# Patient Record
Sex: MALE | Race: BLACK OR AFRICAN AMERICAN | NOT HISPANIC OR LATINO | Employment: OTHER | URBAN - METROPOLITAN AREA
[De-identification: names, ages, dates, MRNs, and addresses within clinical notes are randomized per-mention and may not be internally consistent; named-entity substitution may affect disease eponyms.]

---

## 2018-08-30 ENCOUNTER — OFFICE VISIT (OUTPATIENT)
Dept: PODIATRY | Facility: CLINIC | Age: 73
End: 2018-08-30
Payer: MEDICARE

## 2018-08-30 DIAGNOSIS — L60.8 DISCOLORATION OF NAIL: ICD-10-CM

## 2018-08-30 DIAGNOSIS — M79.674 PAIN OF TOE OF RIGHT FOOT: ICD-10-CM

## 2018-08-30 DIAGNOSIS — S99.921A INJURY OF TOENAIL OF RIGHT FOOT, INITIAL ENCOUNTER: Primary | ICD-10-CM

## 2018-08-30 PROCEDURE — 99201 *HC E&M-NEW PATIENT-LVL I: CPT | Mod: PBBFAC | Performed by: PODIATRIST

## 2018-08-30 PROCEDURE — 99999 PR PBB SHADOW E&M-NEW PATIENT-LVL I: CPT | Mod: PBBFAC,,, | Performed by: PODIATRIST

## 2018-08-30 PROCEDURE — 99202 OFFICE O/P NEW SF 15 MIN: CPT | Mod: S$PBB,,, | Performed by: PODIATRIST

## 2018-08-30 RX ORDER — TAMSULOSIN HYDROCHLORIDE 0.4 MG/1
CAPSULE ORAL
COMMUNITY
Start: 2018-08-28 | End: 2021-06-16 | Stop reason: SDUPTHER

## 2018-08-30 RX ORDER — PRAVASTATIN SODIUM 20 MG/1
10 TABLET ORAL
COMMUNITY
Start: 2018-08-28 | End: 2021-05-06

## 2018-09-02 NOTE — PROGRESS NOTES
Subjective:      Patient ID: Madi Renee is a 73 y.o. male.    Chief Complaint: Nail Problem (Hit Lt big toe and nail is black)  Patient presents with complaint pain and discoloration of right big toenail. He first noticed the nail was black about 2 weeks ago, can not recall trauma, had some discomfort Sunday possibily due to shoes he was wearing. Denies drainage, no pain today.      ROS     Constitutional    Pleasant, well-nourished, no distress, well oriented. Patient relates he is in good health, takes medication for HTN and cholesterol, both well controlled    Eyes         GLASSES    Cardiovascular          No chest pain, no shortness of breath    Respiratory           No cough, no congestion     Musculoskeletal        No muscle aches, no arthralgias/joint pain, no back pain, no swelling  in the extremities           Objective:      Physical Exam  Vascular         Arterial Pulses Right: posterior tibialis 2/4, dorsalis pedis 2/4, normal CFT   Arterial Pulses Left: posterior tibialis 2/4, dorsalis pedis 2/4, normal CFT   No lower extremity edema bilateral   Pedal skin temperature and color are normal bilateral     Integumentary   Majority of right hallux nail is black, obvious trauma occurred some time ago. Nail is flat, attached at this time, no evidence of subungual hematoma, no pain upon palpation nail plate. No surrounding erythema or edema. There is brusing of lateral second digit nail left foot, stable. Skin is in good condition, no other complications.           Neurological   Gross sensation intact    Musculoskeletal   Muscle Strength/Testing and Tone:  Intact, normal tone bilateral   Joints, Bones, and Muscles: Normal with normal ROM consistent with age           Assessment:       Encounter Diagnoses   Name Primary?    Injury of toenail of right foot, initial encounter Yes    Discoloration of nail - Right Foot     Pain of toe of right foot          Plan:       Madi was seen today for nail  problem.    Diagnoses and all orders for this visit:    Injury of toenail of right foot, initial encounter    Discoloration of nail - Right Foot    Pain of toe of right foot      Advised patient there was definitely trauma to the right great toenail.  We discussed extent of bruising, bleeding under the nail and pointed out injury involved the and this 2nd digit nail as well.  Advised patient areas stable, nail is flat with no tenderness upon palpation.  Advised patient he needs to wear wide, comfortable shoes with no additional pressure on this area.  Explained as long as the area stays flat and is pain free discoloration of the nail should grow out without complication, however injuries involving the nail can progress quickly if a sore abscess develops underneath and cannot drain.  Explained if pain, redness and swelling occur he should start immediately to soak in warm water and Epson salt, if this is not effective after a day or 2 he needs to contact the office for follow-up.  We discussed signs of infection to monitor for.  Instructed patient to keep the area clean, dry, no antibiotic ointment.  Patient related he was in understanding and agreement with treatment plan.  Counseled the patient on his conditions, their implications and medical management.  Instructed patient to contact the office with any changes, questions, concerns, worsening of symptoms. Patient verbalized understanding.   Total face to face time, exam, assessment, treatment, discussion, documentation 20 minutes, more than half this time spent on consultation and coordination of care.   Follow as needed.      This note was created using M*Modal voice recognition software that occasionally misinterpreted phrases or words.

## 2018-11-12 ENCOUNTER — LAB VISIT (OUTPATIENT)
Dept: LAB | Facility: HOSPITAL | Age: 73
End: 2018-11-12
Attending: UROLOGY
Payer: MEDICARE

## 2018-11-12 DIAGNOSIS — R97.20 ELEVATED PROSTATE SPECIFIC ANTIGEN (PSA): Primary | ICD-10-CM

## 2018-11-12 LAB — COMPLEXED PSA SERPL-MCNC: 1.7 NG/ML

## 2018-11-12 PROCEDURE — 84153 ASSAY OF PSA TOTAL: CPT

## 2018-11-12 PROCEDURE — 36415 COLL VENOUS BLD VENIPUNCTURE: CPT

## 2019-02-16 ENCOUNTER — HOSPITAL ENCOUNTER (EMERGENCY)
Facility: HOSPITAL | Age: 74
Discharge: HOME | End: 2019-02-16
Attending: EMERGENCY MEDICINE
Payer: MEDICARE

## 2019-02-16 VITALS
DIASTOLIC BLOOD PRESSURE: 76 MMHG | SYSTOLIC BLOOD PRESSURE: 145 MMHG | TEMPERATURE: 98 F | HEART RATE: 57 BPM | BODY MASS INDEX: 25.62 KG/M2 | RESPIRATION RATE: 23 BRPM | WEIGHT: 173 LBS | OXYGEN SATURATION: 97 % | HEIGHT: 69 IN

## 2019-02-16 DIAGNOSIS — H81.10 BENIGN PAROXYSMAL POSITIONAL VERTIGO, UNSPECIFIED LATERALITY: Primary | ICD-10-CM

## 2019-02-16 DIAGNOSIS — R42 DIZZINESS: ICD-10-CM

## 2019-02-16 PROCEDURE — 25000003 PHARM REV CODE 250: Performed by: EMERGENCY MEDICINE

## 2019-02-16 PROCEDURE — 93005 ELECTROCARDIOGRAM TRACING: CPT

## 2019-02-16 PROCEDURE — 93010 EKG 12-LEAD: ICD-10-PCS | Mod: ,,, | Performed by: INTERNAL MEDICINE

## 2019-02-16 PROCEDURE — 99283 EMERGENCY DEPT VISIT LOW MDM: CPT

## 2019-02-16 PROCEDURE — 93010 ELECTROCARDIOGRAM REPORT: CPT | Mod: ,,, | Performed by: INTERNAL MEDICINE

## 2019-02-16 RX ORDER — FLUNISOLIDE 0.25 MG/ML
2 SOLUTION NASAL
COMMUNITY
End: 2021-05-06

## 2019-02-16 RX ORDER — MECLIZINE HCL 12.5 MG 12.5 MG/1
12.5 TABLET ORAL 3 TIMES DAILY PRN
Qty: 20 TABLET | Refills: 0 | Status: SHIPPED | OUTPATIENT
Start: 2019-02-16 | End: 2021-05-06

## 2019-02-16 RX ORDER — MECLIZINE HCL 12.5 MG 12.5 MG/1
12.5 TABLET ORAL
Status: COMPLETED | OUTPATIENT
Start: 2019-02-16 | End: 2019-02-16

## 2019-02-16 RX ORDER — ASPIRIN 81 MG/1
81 TABLET ORAL DAILY
COMMUNITY
End: 2021-05-06

## 2019-02-16 RX ORDER — VALSARTAN 80 MG/1
80 TABLET ORAL DAILY
COMMUNITY
End: 2021-05-06 | Stop reason: SDUPTHER

## 2019-02-16 RX ORDER — FINASTERIDE 5 MG/1
5 TABLET, FILM COATED ORAL DAILY
COMMUNITY
End: 2021-06-16 | Stop reason: SDUPTHER

## 2019-02-16 RX ORDER — MINERAL OIL
180 ENEMA (ML) RECTAL DAILY
COMMUNITY
End: 2021-06-19

## 2019-02-16 RX ADMIN — MECLIZINE HCL 12.5 MG: 12.5 TABLET ORAL at 09:02

## 2019-02-16 NOTE — ED PROVIDER NOTES
Encounter Date: 2/16/2019       History     Chief Complaint   Patient presents with    Dizziness     pt repors he awoke this am with dizziness     72yo male with pmh benign positional vertigo diagnosed and followed by Dr. Platt (ENT) presents to ED for evaluation of dizziness upon waking this morning. States he woke up, was dizzy, did the maneuvers he was taught, and had some relief. However, later this morning, he again became dizzy and decided to come in for evaluation. Denies any pain complaints, reports no falls.             Review of patient's allergies indicates:  No Known Allergies  Past Medical History:   Diagnosis Date    High cholesterol     Hypertension     Vertigo      Past Surgical History:   Procedure Laterality Date    pallup removal      SHOULDER SURGERY Right     SPINAL FUSION       History reviewed. No pertinent family history.  Social History     Tobacco Use    Smoking status: Never Smoker    Smokeless tobacco: Never Used   Substance Use Topics    Alcohol use: No     Frequency: Never    Drug use: No     Review of Systems   Constitutional: Negative for chills, diaphoresis, fatigue and fever.   HENT: Negative for congestion, ear pain, rhinorrhea, sinus pressure, sinus pain, sneezing, sore throat, tinnitus and trouble swallowing.    Eyes: Negative for photophobia, redness and visual disturbance.   Respiratory: Negative for cough, choking, chest tightness, shortness of breath, wheezing and stridor.    Cardiovascular: Negative for chest pain, palpitations and leg swelling.   Gastrointestinal: Negative for abdominal pain, constipation, diarrhea, nausea and vomiting.   Endocrine: Negative for cold intolerance, heat intolerance, polydipsia, polyphagia and polyuria.   Genitourinary: Negative for decreased urine volume, difficulty urinating, dysuria, flank pain, frequency and urgency.   Musculoskeletal: Negative for arthralgias, back pain, gait problem, joint swelling, myalgias, neck pain and  neck stiffness.   Skin: Negative for color change, pallor, rash and wound.   Neurological: Positive for dizziness. Negative for tremors, seizures, syncope, facial asymmetry, speech difficulty, weakness, light-headedness, numbness and headaches.   Hematological: Negative for adenopathy. Does not bruise/bleed easily.   All other systems reviewed and are negative.      Physical Exam     Initial Vitals [02/16/19 0931]   BP Pulse Resp Temp SpO2   (!) 148/78 (!) 55 20 98.2 °F (36.8 °C) 95 %      MAP       --         Physical Exam    Nursing note and vitals reviewed.  Constitutional: He appears well-developed and well-nourished. He is not diaphoretic. No distress.   HENT:   Head: Normocephalic and atraumatic.   Right Ear: External ear normal.   Left Ear: External ear normal.   Nose: Nose normal.   Mouth/Throat: Oropharynx is clear and moist. No oropharyngeal exudate.   Eyes: Conjunctivae and EOM are normal. Pupils are equal, round, and reactive to light. No scleral icterus. Right eye exhibits no nystagmus. Left eye exhibits no nystagmus.   Neck: Normal range of motion. Neck supple.   Cardiovascular: Normal rate, regular rhythm, normal heart sounds and intact distal pulses.   No murmur heard.  Pulmonary/Chest: Breath sounds normal. No respiratory distress. He has no wheezes. He has no rhonchi. He exhibits no tenderness.   Abdominal: Soft. Bowel sounds are normal. He exhibits no distension. There is no tenderness. There is no rebound and no guarding.   Musculoskeletal: Normal range of motion. He exhibits no edema or tenderness.   Neurological: He is alert and oriented to person, place, and time. He has normal strength and normal reflexes. He displays normal reflexes. No cranial nerve deficit or sensory deficit. GCS score is 15. GCS eye subscore is 4. GCS verbal subscore is 5. GCS motor subscore is 6.   Skin: Skin is warm. Capillary refill takes less than 2 seconds. No rash noted. No erythema.   Psychiatric: He has a normal  mood and affect. His behavior is normal. Judgment and thought content normal.         ED Course   Procedures  Labs Reviewed - No data to display  EKG Readings: (Independently Interpreted)   Initial Reading: No STEMI. Rhythm: Sinus Bradycardia. Heart Rate: 54. Ectopy: No Ectopy. Conduction: Normal. ST Segments: Normal ST Segments. T Waves: Normal. Axis: Normal. Clinical Impression: Sinus Bradycardia       Imaging Results    None          Medical Decision Making:   Differential Diagnosis:   Benign positional vertigo, orthostatic hypotension  ED Management:  EKG and orthostatics unremarkable. Improved with meclizine. Pt comfortable with discharge and close follow up with Dr. Platt.                       Clinical Impression:   The primary encounter diagnosis was Benign paroxysmal positional vertigo, unspecified laterality. A diagnosis of Dizziness was also pertinent to this visit.      Disposition:   Disposition: Discharged  Condition: Stable                        Nicolle Savage MD  02/16/19 1016

## 2020-10-27 DIAGNOSIS — M25.512 PAIN IN LEFT SHOULDER: ICD-10-CM

## 2020-10-27 DIAGNOSIS — M25.572 PAIN IN LEFT ANKLE: Primary | ICD-10-CM

## 2021-02-23 ENCOUNTER — LAB VISIT (OUTPATIENT)
Dept: LAB | Facility: HOSPITAL | Age: 76
End: 2021-02-23
Attending: UROLOGY
Payer: MEDICARE

## 2021-02-23 DIAGNOSIS — Z12.5 SPECIAL SCREENING FOR MALIGNANT NEOPLASM OF PROSTATE: Primary | ICD-10-CM

## 2021-02-23 LAB — COMPLEXED PSA SERPL-MCNC: 2.2 NG/ML (ref 0–4)

## 2021-02-23 PROCEDURE — 84153 ASSAY OF PSA TOTAL: CPT

## 2021-02-23 PROCEDURE — 36415 COLL VENOUS BLD VENIPUNCTURE: CPT

## 2021-03-08 ENCOUNTER — OFFICE VISIT (OUTPATIENT)
Dept: PODIATRY | Facility: CLINIC | Age: 76
End: 2021-03-08
Payer: MEDICARE

## 2021-03-08 VITALS
DIASTOLIC BLOOD PRESSURE: 76 MMHG | HEIGHT: 69 IN | TEMPERATURE: 98 F | HEART RATE: 63 BPM | BODY MASS INDEX: 23.99 KG/M2 | WEIGHT: 162 LBS | SYSTOLIC BLOOD PRESSURE: 137 MMHG

## 2021-03-08 DIAGNOSIS — M76.829 TIBIALIS POSTERIOR TENDINITIS, UNSPECIFIED LATERALITY: ICD-10-CM

## 2021-03-08 DIAGNOSIS — M76.70 PERONEAL TENDONITIS, UNSPECIFIED LATERALITY: Primary | ICD-10-CM

## 2021-03-08 PROCEDURE — 99213 OFFICE O/P EST LOW 20 MIN: CPT | Mod: PBBFAC | Performed by: PODIATRIST

## 2021-03-08 PROCEDURE — 99214 PR OFFICE/OUTPT VISIT, EST, LEVL IV, 30-39 MIN: ICD-10-PCS | Mod: S$PBB,,, | Performed by: PODIATRIST

## 2021-03-08 PROCEDURE — 99214 OFFICE O/P EST MOD 30 MIN: CPT | Mod: S$PBB,,, | Performed by: PODIATRIST

## 2021-03-08 PROCEDURE — 99999 PR PBB SHADOW E&M-EST. PATIENT-LVL III: CPT | Mod: PBBFAC,,, | Performed by: PODIATRIST

## 2021-03-08 PROCEDURE — 99999 PR PBB SHADOW E&M-EST. PATIENT-LVL III: ICD-10-PCS | Mod: PBBFAC,,, | Performed by: PODIATRIST

## 2021-03-08 RX ORDER — PRAVASTATIN SODIUM 40 MG/1
TABLET ORAL
COMMUNITY
Start: 2021-02-09 | End: 2021-05-06 | Stop reason: SDUPTHER

## 2021-03-08 RX ORDER — FLUOROURACIL 50 MG/G
CREAM TOPICAL
COMMUNITY
Start: 2020-12-30 | End: 2021-05-06

## 2021-03-08 RX ORDER — FLUTICASONE PROPIONATE 50 MCG
SPRAY, SUSPENSION (ML) NASAL
COMMUNITY
Start: 2021-02-09 | End: 2021-05-19 | Stop reason: SDUPTHER

## 2021-05-03 ENCOUNTER — TELEPHONE (OUTPATIENT)
Dept: PODIATRY | Facility: CLINIC | Age: 76
End: 2021-05-03

## 2021-05-06 ENCOUNTER — PATIENT OUTREACH (OUTPATIENT)
Dept: ADMINISTRATIVE | Facility: HOSPITAL | Age: 76
End: 2021-05-06

## 2021-05-06 ENCOUNTER — OFFICE VISIT (OUTPATIENT)
Dept: FAMILY MEDICINE | Facility: CLINIC | Age: 76
End: 2021-05-06
Payer: MEDICARE

## 2021-05-06 ENCOUNTER — LAB VISIT (OUTPATIENT)
Dept: LAB | Facility: HOSPITAL | Age: 76
End: 2021-05-06
Attending: FAMILY MEDICINE
Payer: MEDICARE

## 2021-05-06 VITALS
WEIGHT: 164.81 LBS | OXYGEN SATURATION: 97 % | SYSTOLIC BLOOD PRESSURE: 124 MMHG | HEART RATE: 84 BPM | DIASTOLIC BLOOD PRESSURE: 64 MMHG | HEIGHT: 69 IN | RESPIRATION RATE: 12 BRPM | BODY MASS INDEX: 24.41 KG/M2 | TEMPERATURE: 98 F

## 2021-05-06 DIAGNOSIS — Z11.4 SCREENING FOR HIV (HUMAN IMMUNODEFICIENCY VIRUS): ICD-10-CM

## 2021-05-06 DIAGNOSIS — Z00.00 ANNUAL PHYSICAL EXAM: ICD-10-CM

## 2021-05-06 DIAGNOSIS — Z13.6 ENCOUNTER FOR SCREENING FOR CARDIOVASCULAR DISORDERS: ICD-10-CM

## 2021-05-06 DIAGNOSIS — R79.9 ABNORMAL FINDING OF BLOOD CHEMISTRY, UNSPECIFIED: ICD-10-CM

## 2021-05-06 DIAGNOSIS — Z11.59 ENCOUNTER FOR HEPATITIS C SCREENING TEST FOR LOW RISK PATIENT: ICD-10-CM

## 2021-05-06 DIAGNOSIS — I10 ESSENTIAL HYPERTENSION: ICD-10-CM

## 2021-05-06 DIAGNOSIS — N40.0 BENIGN PROSTATIC HYPERPLASIA WITHOUT LOWER URINARY TRACT SYMPTOMS: ICD-10-CM

## 2021-05-06 DIAGNOSIS — E78.5 HYPERLIPIDEMIA, UNSPECIFIED HYPERLIPIDEMIA TYPE: ICD-10-CM

## 2021-05-06 DIAGNOSIS — Z76.89 ENCOUNTER TO ESTABLISH CARE: Primary | ICD-10-CM

## 2021-05-06 LAB
ALBUMIN SERPL BCP-MCNC: 4.2 G/DL (ref 3.5–5.2)
ALP SERPL-CCNC: 95 U/L (ref 55–135)
ALT SERPL W/O P-5'-P-CCNC: 18 U/L (ref 10–44)
ANION GAP SERPL CALC-SCNC: 8 MMOL/L (ref 8–16)
AST SERPL-CCNC: 21 U/L (ref 10–40)
BASOPHILS # BLD AUTO: 0.03 K/UL (ref 0–0.2)
BASOPHILS NFR BLD: 0.5 % (ref 0–1.9)
BILIRUB SERPL-MCNC: 1.4 MG/DL (ref 0.1–1)
BUN SERPL-MCNC: 11 MG/DL (ref 8–23)
CALCIUM SERPL-MCNC: 8.9 MG/DL (ref 8.7–10.5)
CHLORIDE SERPL-SCNC: 106 MMOL/L (ref 95–110)
CHOLEST SERPL-MCNC: 128 MG/DL (ref 120–199)
CHOLEST/HDLC SERPL: 3.6 {RATIO} (ref 2–5)
CO2 SERPL-SCNC: 26 MMOL/L (ref 23–29)
CREAT SERPL-MCNC: 0.8 MG/DL (ref 0.5–1.4)
DIFFERENTIAL METHOD: ABNORMAL
EOSINOPHIL # BLD AUTO: 0.5 K/UL (ref 0–0.5)
EOSINOPHIL NFR BLD: 8.8 % (ref 0–8)
ERYTHROCYTE [DISTWIDTH] IN BLOOD BY AUTOMATED COUNT: 12.4 % (ref 11.5–14.5)
EST. GFR  (AFRICAN AMERICAN): >60 ML/MIN/1.73 M^2
EST. GFR  (NON AFRICAN AMERICAN): >60 ML/MIN/1.73 M^2
GLUCOSE SERPL-MCNC: 92 MG/DL (ref 70–110)
HCT VFR BLD AUTO: 44.5 % (ref 40–54)
HDLC SERPL-MCNC: 36 MG/DL (ref 40–75)
HDLC SERPL: 28.1 % (ref 20–50)
HGB BLD-MCNC: 15.2 G/DL (ref 14–18)
IMM GRANULOCYTES # BLD AUTO: 0.01 K/UL (ref 0–0.04)
IMM GRANULOCYTES NFR BLD AUTO: 0.2 % (ref 0–0.5)
LDLC SERPL CALC-MCNC: 81.4 MG/DL (ref 63–159)
LYMPHOCYTES # BLD AUTO: 1.1 K/UL (ref 1–4.8)
LYMPHOCYTES NFR BLD: 17.5 % (ref 18–48)
MCH RBC QN AUTO: 30.8 PG (ref 27–31)
MCHC RBC AUTO-ENTMCNC: 34.2 G/DL (ref 32–36)
MCV RBC AUTO: 90 FL (ref 82–98)
MONOCYTES # BLD AUTO: 0.5 K/UL (ref 0.3–1)
MONOCYTES NFR BLD: 7.9 % (ref 4–15)
NEUTROPHILS # BLD AUTO: 3.9 K/UL (ref 1.8–7.7)
NEUTROPHILS NFR BLD: 65.1 % (ref 38–73)
NONHDLC SERPL-MCNC: 92 MG/DL
NRBC BLD-RTO: 0 /100 WBC
PLATELET # BLD AUTO: 202 K/UL (ref 150–450)
PMV BLD AUTO: 9.2 FL (ref 9.2–12.9)
POTASSIUM SERPL-SCNC: 4.1 MMOL/L (ref 3.5–5.1)
PROT SERPL-MCNC: 7.8 G/DL (ref 6–8.4)
RBC # BLD AUTO: 4.93 M/UL (ref 4.6–6.2)
SODIUM SERPL-SCNC: 140 MMOL/L (ref 136–145)
TRIGL SERPL-MCNC: 53 MG/DL (ref 30–150)
WBC # BLD AUTO: 6.04 K/UL (ref 3.9–12.7)

## 2021-05-06 PROCEDURE — 99204 PR OFFICE/OUTPT VISIT, NEW, LEVL IV, 45-59 MIN: ICD-10-PCS | Mod: S$GLB,,, | Performed by: FAMILY MEDICINE

## 2021-05-06 PROCEDURE — 99204 OFFICE O/P NEW MOD 45 MIN: CPT | Mod: S$GLB,,, | Performed by: FAMILY MEDICINE

## 2021-05-06 PROCEDURE — 80053 COMPREHEN METABOLIC PANEL: CPT | Performed by: FAMILY MEDICINE

## 2021-05-06 PROCEDURE — 36415 COLL VENOUS BLD VENIPUNCTURE: CPT | Performed by: FAMILY MEDICINE

## 2021-05-06 PROCEDURE — 86703 HIV-1/HIV-2 1 RESULT ANTBDY: CPT | Performed by: FAMILY MEDICINE

## 2021-05-06 PROCEDURE — 80061 LIPID PANEL: CPT | Performed by: FAMILY MEDICINE

## 2021-05-06 PROCEDURE — 85025 COMPLETE CBC W/AUTO DIFF WBC: CPT | Performed by: FAMILY MEDICINE

## 2021-05-06 PROCEDURE — 86803 HEPATITIS C AB TEST: CPT | Performed by: FAMILY MEDICINE

## 2021-05-06 RX ORDER — VALSARTAN 80 MG/1
80 TABLET ORAL DAILY
Qty: 90 TABLET | Refills: 3 | Status: SHIPPED | OUTPATIENT
Start: 2021-05-06 | End: 2022-05-11 | Stop reason: SDUPTHER

## 2021-05-06 RX ORDER — PRAVASTATIN SODIUM 40 MG/1
40 TABLET ORAL DAILY
Qty: 90 TABLET | Refills: 3 | Status: SHIPPED | OUTPATIENT
Start: 2021-05-06 | End: 2022-05-11 | Stop reason: SDUPTHER

## 2021-05-07 LAB
HCV AB SERPL QL IA: NEGATIVE
HIV 1+2 AB+HIV1 P24 AG SERPL QL IA: NEGATIVE

## 2021-05-19 RX ORDER — FLUTICASONE PROPIONATE 50 MCG
2 SPRAY, SUSPENSION (ML) NASAL DAILY
Qty: 9.9 ML | Refills: 11 | Status: SHIPPED | OUTPATIENT
Start: 2021-05-19 | End: 2021-10-09 | Stop reason: SDUPTHER

## 2021-06-02 ENCOUNTER — OFFICE VISIT (OUTPATIENT)
Dept: PODIATRY | Facility: CLINIC | Age: 76
End: 2021-06-02
Payer: MEDICARE

## 2021-06-02 VITALS
HEIGHT: 69 IN | BODY MASS INDEX: 23.99 KG/M2 | HEART RATE: 56 BPM | DIASTOLIC BLOOD PRESSURE: 68 MMHG | WEIGHT: 162 LBS | SYSTOLIC BLOOD PRESSURE: 128 MMHG | TEMPERATURE: 98 F

## 2021-06-02 DIAGNOSIS — M76.70 PERONEAL TENDONITIS, UNSPECIFIED LATERALITY: Primary | ICD-10-CM

## 2021-06-02 PROCEDURE — 99999 PR PBB SHADOW E&M-EST. PATIENT-LVL III: CPT | Mod: PBBFAC,,, | Performed by: PODIATRIST

## 2021-06-02 PROCEDURE — 99213 OFFICE O/P EST LOW 20 MIN: CPT | Mod: S$PBB,,, | Performed by: PODIATRIST

## 2021-06-02 PROCEDURE — 99213 OFFICE O/P EST LOW 20 MIN: CPT | Mod: PBBFAC | Performed by: PODIATRIST

## 2021-06-02 PROCEDURE — 99999 PR PBB SHADOW E&M-EST. PATIENT-LVL III: ICD-10-PCS | Mod: PBBFAC,,, | Performed by: PODIATRIST

## 2021-06-02 PROCEDURE — 99213 PR OFFICE/OUTPT VISIT, EST, LEVL III, 20-29 MIN: ICD-10-PCS | Mod: S$PBB,,, | Performed by: PODIATRIST

## 2021-06-05 PROBLEM — M76.70 PERONEAL TENDONITIS, UNSPECIFIED LATERALITY: Status: ACTIVE | Noted: 2021-06-05

## 2021-06-16 ENCOUNTER — OFFICE VISIT (OUTPATIENT)
Dept: PODIATRY | Facility: CLINIC | Age: 76
End: 2021-06-16
Payer: MEDICARE

## 2021-06-16 VITALS
BODY MASS INDEX: 23.99 KG/M2 | HEART RATE: 58 BPM | DIASTOLIC BLOOD PRESSURE: 58 MMHG | WEIGHT: 162 LBS | RESPIRATION RATE: 18 BRPM | SYSTOLIC BLOOD PRESSURE: 116 MMHG | HEIGHT: 69 IN

## 2021-06-16 DIAGNOSIS — M76.70 PERONEAL TENDONITIS, UNSPECIFIED LATERALITY: Primary | ICD-10-CM

## 2021-06-16 PROCEDURE — 99999 PR PBB SHADOW E&M-EST. PATIENT-LVL III: CPT | Mod: PBBFAC,,, | Performed by: PODIATRIST

## 2021-06-16 PROCEDURE — 99213 PR OFFICE/OUTPT VISIT, EST, LEVL III, 20-29 MIN: ICD-10-PCS | Mod: S$PBB,,, | Performed by: PODIATRIST

## 2021-06-16 PROCEDURE — 99213 OFFICE O/P EST LOW 20 MIN: CPT | Mod: S$PBB,,, | Performed by: PODIATRIST

## 2021-06-16 PROCEDURE — 99213 OFFICE O/P EST LOW 20 MIN: CPT | Mod: PBBFAC | Performed by: PODIATRIST

## 2021-06-16 PROCEDURE — 99999 PR PBB SHADOW E&M-EST. PATIENT-LVL III: ICD-10-PCS | Mod: PBBFAC,,, | Performed by: PODIATRIST

## 2021-06-16 RX ORDER — FEXOFENADINE HCL 60 MG
TABLET ORAL
COMMUNITY
End: 2023-10-20

## 2021-06-16 RX ORDER — PRAVASTATIN SODIUM 40 MG/1
40 TABLET ORAL
COMMUNITY
Start: 2021-03-09 | End: 2021-06-19 | Stop reason: SDUPTHER

## 2021-06-16 RX ORDER — TAMSULOSIN HYDROCHLORIDE 0.4 MG/1
1 CAPSULE ORAL
COMMUNITY
End: 2023-11-27 | Stop reason: SDUPTHER

## 2021-06-16 RX ORDER — FLUOROURACIL 50 MG/G
CREAM TOPICAL
COMMUNITY
End: 2021-11-11

## 2021-06-16 RX ORDER — FINASTERIDE 5 MG/1
TABLET, FILM COATED ORAL
COMMUNITY
End: 2023-11-27 | Stop reason: SDUPTHER

## 2021-08-20 ENCOUNTER — LAB VISIT (OUTPATIENT)
Dept: LAB | Facility: HOSPITAL | Age: 76
End: 2021-08-20
Attending: UROLOGY
Payer: MEDICARE

## 2021-08-20 DIAGNOSIS — Z12.5 SPECIAL SCREENING FOR MALIGNANT NEOPLASM OF PROSTATE: Primary | ICD-10-CM

## 2021-08-20 LAB — COMPLEXED PSA SERPL-MCNC: 2.6 NG/ML (ref 0–4)

## 2021-08-20 PROCEDURE — 36415 COLL VENOUS BLD VENIPUNCTURE: CPT | Performed by: UROLOGY

## 2021-08-20 PROCEDURE — 84153 ASSAY OF PSA TOTAL: CPT | Performed by: UROLOGY

## 2021-10-09 ENCOUNTER — IMMUNIZATION (OUTPATIENT)
Dept: FAMILY MEDICINE | Facility: CLINIC | Age: 76
End: 2021-10-09
Payer: MEDICARE

## 2021-10-09 DIAGNOSIS — L29.9 SCALP ITCH: Primary | ICD-10-CM

## 2021-10-09 PROCEDURE — 90662 IIV NO PRSV INCREASED AG IM: CPT | Mod: PBBFAC

## 2021-10-09 PROCEDURE — G0008 ADMIN INFLUENZA VIRUS VAC: HCPCS | Mod: PBBFAC

## 2021-10-11 RX ORDER — FLUTICASONE PROPIONATE 50 MCG
2 SPRAY, SUSPENSION (ML) NASAL DAILY
Qty: 9.9 ML | Refills: 11 | Status: SHIPPED | OUTPATIENT
Start: 2021-10-11 | End: 2022-07-01 | Stop reason: SDUPTHER

## 2021-10-29 ENCOUNTER — OFFICE VISIT (OUTPATIENT)
Dept: FAMILY MEDICINE | Facility: CLINIC | Age: 76
End: 2021-10-29
Payer: MEDICARE

## 2021-10-29 VITALS
HEART RATE: 57 BPM | TEMPERATURE: 98 F | BODY MASS INDEX: 24.73 KG/M2 | HEIGHT: 69 IN | WEIGHT: 167 LBS | OXYGEN SATURATION: 97 % | DIASTOLIC BLOOD PRESSURE: 62 MMHG | SYSTOLIC BLOOD PRESSURE: 130 MMHG | RESPIRATION RATE: 18 BRPM

## 2021-10-29 DIAGNOSIS — K13.70 ORAL MUCOSAL LESION: Primary | ICD-10-CM

## 2021-10-29 DIAGNOSIS — I10 ESSENTIAL HYPERTENSION: ICD-10-CM

## 2021-10-29 PROCEDURE — 99213 OFFICE O/P EST LOW 20 MIN: CPT | Mod: S$PBB,,, | Performed by: NURSE PRACTITIONER

## 2021-10-29 PROCEDURE — 99214 OFFICE O/P EST MOD 30 MIN: CPT | Mod: PBBFAC | Performed by: NURSE PRACTITIONER

## 2021-10-29 PROCEDURE — 99999 PR PBB SHADOW E&M-EST. PATIENT-LVL IV: CPT | Mod: PBBFAC,,, | Performed by: NURSE PRACTITIONER

## 2021-10-29 PROCEDURE — 99999 PR PBB SHADOW E&M-EST. PATIENT-LVL IV: ICD-10-PCS | Mod: PBBFAC,,, | Performed by: NURSE PRACTITIONER

## 2021-10-29 PROCEDURE — 99213 PR OFFICE/OUTPT VISIT, EST, LEVL III, 20-29 MIN: ICD-10-PCS | Mod: S$PBB,,, | Performed by: NURSE PRACTITIONER

## 2021-11-11 ENCOUNTER — OFFICE VISIT (OUTPATIENT)
Dept: FAMILY MEDICINE | Facility: CLINIC | Age: 76
End: 2021-11-11
Payer: MEDICARE

## 2021-11-11 VITALS
RESPIRATION RATE: 12 BRPM | SYSTOLIC BLOOD PRESSURE: 126 MMHG | BODY MASS INDEX: 25.03 KG/M2 | TEMPERATURE: 98 F | WEIGHT: 169 LBS | OXYGEN SATURATION: 97 % | HEIGHT: 69 IN | HEART RATE: 53 BPM | DIASTOLIC BLOOD PRESSURE: 72 MMHG

## 2021-11-11 DIAGNOSIS — E78.5 HYPERLIPIDEMIA, UNSPECIFIED HYPERLIPIDEMIA TYPE: ICD-10-CM

## 2021-11-11 DIAGNOSIS — N40.0 BENIGN PROSTATIC HYPERPLASIA WITHOUT LOWER URINARY TRACT SYMPTOMS: ICD-10-CM

## 2021-11-11 DIAGNOSIS — I10 ESSENTIAL HYPERTENSION: Primary | ICD-10-CM

## 2021-11-11 PROCEDURE — 99999 PR PBB SHADOW E&M-EST. PATIENT-LVL IV: ICD-10-PCS | Mod: PBBFAC,,, | Performed by: FAMILY MEDICINE

## 2021-11-11 PROCEDURE — 99213 OFFICE O/P EST LOW 20 MIN: CPT | Mod: S$PBB,,, | Performed by: FAMILY MEDICINE

## 2021-11-11 PROCEDURE — 99213 PR OFFICE/OUTPT VISIT, EST, LEVL III, 20-29 MIN: ICD-10-PCS | Mod: S$PBB,,, | Performed by: FAMILY MEDICINE

## 2021-11-11 PROCEDURE — 99999 PR PBB SHADOW E&M-EST. PATIENT-LVL IV: CPT | Mod: PBBFAC,,, | Performed by: FAMILY MEDICINE

## 2021-11-11 PROCEDURE — 99214 OFFICE O/P EST MOD 30 MIN: CPT | Mod: PBBFAC | Performed by: FAMILY MEDICINE

## 2022-05-07 ENCOUNTER — PATIENT MESSAGE (OUTPATIENT)
Dept: FAMILY MEDICINE | Facility: CLINIC | Age: 77
End: 2022-05-07
Payer: MEDICARE

## 2022-05-07 DIAGNOSIS — I10 ESSENTIAL HYPERTENSION: ICD-10-CM

## 2022-05-07 DIAGNOSIS — E78.5 HYPERLIPIDEMIA, UNSPECIFIED HYPERLIPIDEMIA TYPE: ICD-10-CM

## 2022-05-11 DIAGNOSIS — I10 ESSENTIAL HYPERTENSION: ICD-10-CM

## 2022-05-11 RX ORDER — PRAVASTATIN SODIUM 40 MG/1
40 TABLET ORAL DAILY
Qty: 90 TABLET | Refills: 3 | Status: SHIPPED | OUTPATIENT
Start: 2022-05-11 | End: 2023-06-26 | Stop reason: SDUPTHER

## 2022-05-11 RX ORDER — VALSARTAN 80 MG/1
80 TABLET ORAL DAILY
Qty: 90 TABLET | Refills: 3 | Status: SHIPPED | OUTPATIENT
Start: 2022-05-11 | End: 2023-07-05 | Stop reason: SDUPTHER

## 2022-05-16 ENCOUNTER — PATIENT MESSAGE (OUTPATIENT)
Dept: FAMILY MEDICINE | Facility: CLINIC | Age: 77
End: 2022-05-16
Payer: MEDICARE

## 2022-05-19 ENCOUNTER — LAB VISIT (OUTPATIENT)
Dept: LAB | Facility: HOSPITAL | Age: 77
End: 2022-05-19
Attending: FAMILY MEDICINE
Payer: MEDICARE

## 2022-05-19 ENCOUNTER — OFFICE VISIT (OUTPATIENT)
Dept: FAMILY MEDICINE | Facility: CLINIC | Age: 77
End: 2022-05-19
Payer: MEDICARE

## 2022-05-19 VITALS
WEIGHT: 172 LBS | HEART RATE: 55 BPM | OXYGEN SATURATION: 99 % | TEMPERATURE: 98 F | BODY MASS INDEX: 25.48 KG/M2 | SYSTOLIC BLOOD PRESSURE: 134 MMHG | RESPIRATION RATE: 14 BRPM | DIASTOLIC BLOOD PRESSURE: 68 MMHG | HEIGHT: 69 IN

## 2022-05-19 DIAGNOSIS — E78.5 HYPERLIPIDEMIA, UNSPECIFIED HYPERLIPIDEMIA TYPE: ICD-10-CM

## 2022-05-19 DIAGNOSIS — J44.9 MILD CHRONIC OBSTRUCTIVE PULMONARY DISEASE: ICD-10-CM

## 2022-05-19 DIAGNOSIS — I10 ESSENTIAL HYPERTENSION: ICD-10-CM

## 2022-05-19 DIAGNOSIS — Z00.00 ANNUAL PHYSICAL EXAM: Primary | ICD-10-CM

## 2022-05-19 LAB
ALBUMIN SERPL BCP-MCNC: 3.9 G/DL (ref 3.5–5.2)
ALP SERPL-CCNC: 79 U/L (ref 55–135)
ALT SERPL W/O P-5'-P-CCNC: 24 U/L (ref 10–44)
ANION GAP SERPL CALC-SCNC: 8 MMOL/L (ref 8–16)
AST SERPL-CCNC: 24 U/L (ref 10–40)
BASOPHILS # BLD AUTO: 0.03 K/UL (ref 0–0.2)
BASOPHILS NFR BLD: 0.6 % (ref 0–1.9)
BILIRUB SERPL-MCNC: 1.4 MG/DL (ref 0.1–1)
BUN SERPL-MCNC: 11 MG/DL (ref 8–23)
CALCIUM SERPL-MCNC: 9.1 MG/DL (ref 8.7–10.5)
CHLORIDE SERPL-SCNC: 107 MMOL/L (ref 95–110)
CHOLEST SERPL-MCNC: 154 MG/DL (ref 120–199)
CHOLEST/HDLC SERPL: 3.9 {RATIO} (ref 2–5)
CO2 SERPL-SCNC: 25 MMOL/L (ref 23–29)
CREAT SERPL-MCNC: 0.9 MG/DL (ref 0.5–1.4)
DIFFERENTIAL METHOD: ABNORMAL
EOSINOPHIL # BLD AUTO: 0.4 K/UL (ref 0–0.5)
EOSINOPHIL NFR BLD: 7.1 % (ref 0–8)
ERYTHROCYTE [DISTWIDTH] IN BLOOD BY AUTOMATED COUNT: 12.4 % (ref 11.5–14.5)
EST. GFR  (AFRICAN AMERICAN): >60 ML/MIN/1.73 M^2
EST. GFR  (NON AFRICAN AMERICAN): >60 ML/MIN/1.73 M^2
ESTIMATED AVG GLUCOSE: 108 MG/DL (ref 68–131)
GLUCOSE SERPL-MCNC: 98 MG/DL (ref 70–110)
HBA1C MFR BLD: 5.4 % (ref 4–5.6)
HCT VFR BLD AUTO: 45.9 % (ref 40–54)
HDLC SERPL-MCNC: 40 MG/DL (ref 40–75)
HDLC SERPL: 26 % (ref 20–50)
HGB BLD-MCNC: 15.7 G/DL (ref 14–18)
IMM GRANULOCYTES # BLD AUTO: 0.01 K/UL (ref 0–0.04)
IMM GRANULOCYTES NFR BLD AUTO: 0.2 % (ref 0–0.5)
LDLC SERPL CALC-MCNC: 100.6 MG/DL (ref 63–159)
LYMPHOCYTES # BLD AUTO: 1.1 K/UL (ref 1–4.8)
LYMPHOCYTES NFR BLD: 21.2 % (ref 18–48)
MCH RBC QN AUTO: 31.6 PG (ref 27–31)
MCHC RBC AUTO-ENTMCNC: 34.2 G/DL (ref 32–36)
MCV RBC AUTO: 92 FL (ref 82–98)
MONOCYTES # BLD AUTO: 0.5 K/UL (ref 0.3–1)
MONOCYTES NFR BLD: 9.6 % (ref 4–15)
NEUTROPHILS # BLD AUTO: 3.3 K/UL (ref 1.8–7.7)
NEUTROPHILS NFR BLD: 61.3 % (ref 38–73)
NONHDLC SERPL-MCNC: 114 MG/DL
NRBC BLD-RTO: 0 /100 WBC
PLATELET # BLD AUTO: 159 K/UL (ref 150–450)
PMV BLD AUTO: 9.3 FL (ref 9.2–12.9)
POTASSIUM SERPL-SCNC: 4.2 MMOL/L (ref 3.5–5.1)
PROT SERPL-MCNC: 7.4 G/DL (ref 6–8.4)
RBC # BLD AUTO: 4.97 M/UL (ref 4.6–6.2)
SODIUM SERPL-SCNC: 140 MMOL/L (ref 136–145)
TRIGL SERPL-MCNC: 67 MG/DL (ref 30–150)
WBC # BLD AUTO: 5.32 K/UL (ref 3.9–12.7)

## 2022-05-19 PROCEDURE — 1160F PR REVIEW ALL MEDS BY PRESCRIBER/CLIN PHARMACIST DOCUMENTED: ICD-10-PCS | Mod: CPTII,S$GLB,, | Performed by: FAMILY MEDICINE

## 2022-05-19 PROCEDURE — 3078F PR MOST RECENT DIASTOLIC BLOOD PRESSURE < 80 MM HG: ICD-10-PCS | Mod: CPTII,S$GLB,, | Performed by: FAMILY MEDICINE

## 2022-05-19 PROCEDURE — 1159F PR MEDICATION LIST DOCUMENTED IN MEDICAL RECORD: ICD-10-PCS | Mod: CPTII,S$GLB,, | Performed by: FAMILY MEDICINE

## 2022-05-19 PROCEDURE — 80061 LIPID PANEL: CPT | Performed by: FAMILY MEDICINE

## 2022-05-19 PROCEDURE — 99397 PER PM REEVAL EST PAT 65+ YR: CPT | Mod: S$GLB,,, | Performed by: FAMILY MEDICINE

## 2022-05-19 PROCEDURE — 1101F PT FALLS ASSESS-DOCD LE1/YR: CPT | Mod: CPTII,S$GLB,, | Performed by: FAMILY MEDICINE

## 2022-05-19 PROCEDURE — 1126F PR PAIN SEVERITY QUANTIFIED, NO PAIN PRESENT: ICD-10-PCS | Mod: CPTII,S$GLB,, | Performed by: FAMILY MEDICINE

## 2022-05-19 PROCEDURE — 80053 COMPREHEN METABOLIC PANEL: CPT | Performed by: FAMILY MEDICINE

## 2022-05-19 PROCEDURE — 3288F FALL RISK ASSESSMENT DOCD: CPT | Mod: CPTII,S$GLB,, | Performed by: FAMILY MEDICINE

## 2022-05-19 PROCEDURE — 83036 HEMOGLOBIN GLYCOSYLATED A1C: CPT | Performed by: FAMILY MEDICINE

## 2022-05-19 PROCEDURE — 99999 PR PBB SHADOW E&M-EST. PATIENT-LVL III: ICD-10-PCS | Mod: PBBFAC,,, | Performed by: FAMILY MEDICINE

## 2022-05-19 PROCEDURE — 3078F DIAST BP <80 MM HG: CPT | Mod: CPTII,S$GLB,, | Performed by: FAMILY MEDICINE

## 2022-05-19 PROCEDURE — 3075F SYST BP GE 130 - 139MM HG: CPT | Mod: CPTII,S$GLB,, | Performed by: FAMILY MEDICINE

## 2022-05-19 PROCEDURE — 1101F PR PT FALLS ASSESS DOC 0-1 FALLS W/OUT INJ PAST YR: ICD-10-PCS | Mod: CPTII,S$GLB,, | Performed by: FAMILY MEDICINE

## 2022-05-19 PROCEDURE — 36415 COLL VENOUS BLD VENIPUNCTURE: CPT | Performed by: FAMILY MEDICINE

## 2022-05-19 PROCEDURE — 1160F RVW MEDS BY RX/DR IN RCRD: CPT | Mod: CPTII,S$GLB,, | Performed by: FAMILY MEDICINE

## 2022-05-19 PROCEDURE — 1159F MED LIST DOCD IN RCRD: CPT | Mod: CPTII,S$GLB,, | Performed by: FAMILY MEDICINE

## 2022-05-19 PROCEDURE — 85025 COMPLETE CBC W/AUTO DIFF WBC: CPT | Performed by: FAMILY MEDICINE

## 2022-05-19 PROCEDURE — 99999 PR PBB SHADOW E&M-EST. PATIENT-LVL III: CPT | Mod: PBBFAC,,, | Performed by: FAMILY MEDICINE

## 2022-05-19 PROCEDURE — 3288F PR FALLS RISK ASSESSMENT DOCUMENTED: ICD-10-PCS | Mod: CPTII,S$GLB,, | Performed by: FAMILY MEDICINE

## 2022-05-19 PROCEDURE — 1126F AMNT PAIN NOTED NONE PRSNT: CPT | Mod: CPTII,S$GLB,, | Performed by: FAMILY MEDICINE

## 2022-05-19 PROCEDURE — 99397 PR PREVENTIVE VISIT,EST,65 & OVER: ICD-10-PCS | Mod: S$GLB,,, | Performed by: FAMILY MEDICINE

## 2022-05-19 PROCEDURE — 3075F PR MOST RECENT SYSTOLIC BLOOD PRESS GE 130-139MM HG: ICD-10-PCS | Mod: CPTII,S$GLB,, | Performed by: FAMILY MEDICINE

## 2022-05-19 NOTE — PROGRESS NOTES
[facility] - Clinic Note    Subjective      Mr. Renee is a 76 y.o. male who presents to clinic for Follow-up (6mth follow up)    Patient has a history of hypertension, hyperlipidemia, benign prosthetic hyperplasia, allergies.  His conditions are well controlled.  Blood pressure has been controlled.  Denies any chest pain, blurry vision, headaches.  Is followed by cardiology with Dr. livingston.  Also is followed by urology for his benign prostatic hyperplasia.  Overall has been feeling well.    PMH Madi has a past medical history of High cholesterol, Hypertension, and Vertigo.   PSXH Madi has a past surgical history that includes pallup removal; Spinal fusion; Shoulder surgery (Right); and Colon surgery.   FH Madi's family history includes Cancer in his mother; Hypertension in his father and mother; Stroke in his father; Vision loss in his mother.   SH Madi reports that he quit smoking about 15 years ago. His smoking use included cigarettes. He started smoking about 58 years ago. He has a 30.00 pack-year smoking history. He has never used smokeless tobacco. He reports that he does not drink alcohol and does not use drugs.   ALG Madi is allergic to lisinopril.   MED Madi has a current medication list which includes the following prescription(s): fexofenadine, finasteride, fluticasone propionate, pravastatin, tamsulosin, and valsartan.     Review of Systems   Constitutional: Negative for chills and fever.   HENT: Negative for congestion and rhinorrhea.    Eyes: Negative for visual disturbance.   Respiratory: Negative for cough and shortness of breath.    Cardiovascular: Negative for chest pain.   Gastrointestinal: Negative for abdominal pain, constipation, diarrhea, nausea and vomiting.   Genitourinary: Negative for dysuria.   Musculoskeletal: Negative for myalgias.   Skin: Negative for rash.   Neurological: Negative for weakness and headaches.     Objective     /68 (BP Location: Left arm, Patient Position:  "Sitting, BP Method: Large (Manual))   Pulse (!) 55   Temp 97.8 °F (36.6 °C) (Temporal)   Resp 14   Ht 5' 9" (1.753 m)   Wt 78 kg (172 lb)   SpO2 99%   BMI 25.40 kg/m²     Physical Exam  Vitals and nursing note reviewed.   Constitutional:       General: He is not in acute distress.     Appearance: Normal appearance. He is well-developed. He is not diaphoretic.   HENT:      Head: Normocephalic and atraumatic.      Right Ear: External ear normal.      Left Ear: External ear normal.   Eyes:      General:         Right eye: No discharge.         Left eye: No discharge.   Cardiovascular:      Rate and Rhythm: Normal rate and regular rhythm.      Heart sounds: Murmur heard.   Pulmonary:      Effort: Pulmonary effort is normal.      Breath sounds: Normal breath sounds. No wheezing or rales.   Skin:     General: Skin is warm and dry.   Neurological:      Mental Status: He is alert and oriented to person, place, and time. Mental status is at baseline.   Psychiatric:         Mood and Affect: Mood normal.         Behavior: Behavior normal.         Thought Content: Thought content normal.         Judgment: Judgment normal.        Assessment/Plan     1. Annual physical exam     2. Hyperlipidemia, unspecified hyperlipidemia type  Lipid Panel   3. Essential hypertension  Comprehensive Metabolic Panel    CBC Auto Differential    Hemoglobin A1C   4. Mild chronic obstructive pulmonary disease       Overall doing well.  Continue current medication as prescribed.  Due for lab work as above.  Follow-up in 6 months.    Future Appointments   Date Time Provider Department Center   5/19/2022  9:10 AM [facility], LABORATORY [facility]   11/21/2022 10:00 AM Grover Correa MD [facility]         Grover Correa MD  Family Medicine  [facility]              "

## 2022-05-23 ENCOUNTER — IMMUNIZATION (OUTPATIENT)
Dept: FAMILY MEDICINE | Facility: CLINIC | Age: 77
End: 2022-05-23
Payer: MEDICARE

## 2022-05-23 DIAGNOSIS — Z23 NEED FOR VACCINATION: Primary | ICD-10-CM

## 2022-05-23 PROCEDURE — 0064A COVID-19, MRNA, LNP-S, PF, 100 MCG/0.25 ML DOSE VACCINE (MODERNA BOOSTER): CPT | Mod: PBBFAC | Performed by: FAMILY MEDICINE

## 2022-06-01 ENCOUNTER — PATIENT MESSAGE (OUTPATIENT)
Dept: FAMILY MEDICINE | Facility: CLINIC | Age: 77
End: 2022-06-01
Payer: MEDICARE

## 2022-07-01 RX ORDER — FLUTICASONE PROPIONATE 50 MCG
2 SPRAY, SUSPENSION (ML) NASAL DAILY
Qty: 9.9 ML | Refills: 11 | Status: SHIPPED | OUTPATIENT
Start: 2022-07-01 | End: 2023-10-20

## 2022-09-12 RX ORDER — FLUOCINONIDE TOPICAL SOLUTION USP, 0.05% 0.5 MG/ML
SOLUTION TOPICAL 2 TIMES DAILY
Qty: 60 ML | Refills: 0 | Status: SHIPPED | OUTPATIENT
Start: 2022-09-12 | End: 2023-07-05

## 2022-11-04 ENCOUNTER — TELEPHONE (OUTPATIENT)
Dept: FAMILY MEDICINE | Facility: CLINIC | Age: 77
End: 2022-11-04
Payer: MEDICARE

## 2022-11-04 NOTE — TELEPHONE ENCOUNTER
----- Message from Maida Cortez sent at 11/4/2022  9:47 AM CDT -----  Contact: Patient  Type:  Same Day Appointment Request    Caller is requesting a same day appointment.      Name of Caller: Patient     When is the first available appointment? 12/13    Symptoms: cold     Best Call Back Number:[phone] (home)      Additional Information:

## 2023-02-22 ENCOUNTER — LAB VISIT (OUTPATIENT)
Dept: LAB | Facility: HOSPITAL | Age: 78
End: 2023-02-22
Attending: UROLOGY
Payer: MEDICARE

## 2023-02-22 DIAGNOSIS — R97.20 ELEVATED PROSTATE SPECIFIC ANTIGEN (PSA): Primary | ICD-10-CM

## 2023-02-22 LAB — COMPLEXED PSA SERPL-MCNC: 2.2 NG/ML (ref 0–4)

## 2023-02-22 PROCEDURE — 36415 COLL VENOUS BLD VENIPUNCTURE: CPT | Performed by: UROLOGY

## 2023-02-22 PROCEDURE — 84153 ASSAY OF PSA TOTAL: CPT | Performed by: UROLOGY

## 2023-03-28 ENCOUNTER — TELEPHONE (OUTPATIENT)
Dept: FAMILY MEDICINE | Facility: CLINIC | Age: 78
End: 2023-03-28
Payer: MEDICARE

## 2023-03-28 NOTE — TELEPHONE ENCOUNTER
Dear Dr. Michaud,     In the absence of Grover Correa MD, can you please address this patients concern or request?    Pt reports having URI s/s sinus congestion, cough( yellow phlegm) otc meds are not working. Please advise.     Thank you,  Alejandra Bryan LPN

## 2023-03-28 NOTE — TELEPHONE ENCOUNTER
----- Message from Yakelin Barron sent at 3/28/2023  3:20 PM CDT -----  Contact: patient  Type:  Sooner Appointment Request    Caller is requesting a sooner appointment.  Caller declined first available appointment listed below.  Caller will not accept being placed on the waitlist and is requesting a message be sent to doctor.    Name of Caller:  patient  When is the first available appointment?  may  Symptoms:  sore throat, cough  Best Call Back Number:  [phone] (home)   Additional Information:

## 2023-03-29 RX ORDER — PROMETHAZINE HYDROCHLORIDE AND DEXTROMETHORPHAN HYDROBROMIDE 6.25; 15 MG/5ML; MG/5ML
5 SYRUP ORAL EVERY 6 HOURS PRN
Qty: 240 ML | Refills: 1 | Status: SHIPPED | OUTPATIENT
Start: 2023-03-29 | End: 2023-07-05

## 2023-03-29 RX ORDER — AZITHROMYCIN 250 MG/1
TABLET, FILM COATED ORAL
Qty: 6 TABLET | Refills: 0 | Status: SHIPPED | OUTPATIENT
Start: 2023-03-29 | End: 2023-07-05

## 2023-06-26 DIAGNOSIS — E78.5 HYPERLIPIDEMIA, UNSPECIFIED HYPERLIPIDEMIA TYPE: ICD-10-CM

## 2023-06-26 NOTE — TELEPHONE ENCOUNTER
----- Message from Elvie Huang sent at 6/26/2023  9:06 AM CDT -----  Type:  RX Refill Request    Who Called:  pt    Refill or New Rx:  refill    RX Name and Strength:  pravastatin (PRAVACHOL) 40 MG tablet and valsartan (DIOVAN) 80 MG tablet    How is the patient currently taking it? (ex. 1XDay):  as directed    Is this a 30 day or 90 day RX:  90    Preferred Pharmacy with phone number:    [facility] - [place], [place] - [address]  [address] [address]  Phone: [phone] Fax: [phone]    Local or Mail Order:  Mail order    Ordering Provider:  Dr. Sunny Young Call Back Number:  [phone]    Additional Information:  pt is calling the office because these to prescriptions were denied to this pharmacy and he needs these medications. Please call back to advise. Thanks!

## 2023-06-27 RX ORDER — PRAVASTATIN SODIUM 40 MG/1
40 TABLET ORAL DAILY
Qty: 30 TABLET | Refills: 0 | Status: SHIPPED | OUTPATIENT
Start: 2023-06-27 | End: 2023-07-05 | Stop reason: SDUPTHER

## 2023-07-03 ENCOUNTER — TELEPHONE (OUTPATIENT)
Dept: FAMILY MEDICINE | Facility: CLINIC | Age: 78
End: 2023-07-03
Payer: MEDICARE

## 2023-07-03 DIAGNOSIS — E78.5 HYPERLIPIDEMIA, UNSPECIFIED HYPERLIPIDEMIA TYPE: ICD-10-CM

## 2023-07-03 DIAGNOSIS — I10 ESSENTIAL HYPERTENSION: ICD-10-CM

## 2023-07-03 NOTE — TELEPHONE ENCOUNTER
----- Message from Constanzadani Raphaelrayo sent at 7/3/2023  9:31 AM CDT -----  Regarding: Needs Medical Advice/RX  Contact: patient at [phone]  Type: Needs Medical Advice  Who Called:  patient at [phone]    Pharmacy name and phone #:    [facility] - [place], [place] - [address]  [address] [address]  Phone: [phone] Fax: [phone]  Additional Information: Patient has questions about medication pravastatin as to why he is only getting a a 30 day refill now when he was getting a 90 day. He is also requesting a refill for valsartan, but was told it was denied. Please call and advise. Thank you      valsartan (DIOVAN) 80 MG tablet 90 tablet 3 5/11/2022 5/11/2023   Sig - Route: Take 1 tablet (80 mg total) by mouth once daily. - Oral   Sent to pharmacy as: valsartan (DIOVAN) 80 MG tablet   Notes to Pharmacy: .   E-Prescribing Status: Receipt confirmed by pharmacy (5/11/2022  6:14 PM CDT)     pravastatin (PRAVACHOL) 40 MG tablet 30 tablet 0 6/27/2023 6/26/2024   Sig - Route: Take 1 tablet (40 mg total) by mouth once daily. - Oral   Sent to pharmacy as: pravastatin (PRAVACHOL) 40 MG tablet   E-Prescribing Status: Receipt confirmed by pharmacy (6/27/2023 12:07 PM CDT)

## 2023-07-05 ENCOUNTER — OFFICE VISIT (OUTPATIENT)
Dept: FAMILY MEDICINE | Facility: CLINIC | Age: 78
End: 2023-07-05
Payer: MEDICARE

## 2023-07-05 VITALS
HEIGHT: 69 IN | DIASTOLIC BLOOD PRESSURE: 66 MMHG | RESPIRATION RATE: 16 BRPM | SYSTOLIC BLOOD PRESSURE: 118 MMHG | TEMPERATURE: 98 F | HEART RATE: 58 BPM | OXYGEN SATURATION: 96 % | WEIGHT: 168.19 LBS | BODY MASS INDEX: 24.91 KG/M2

## 2023-07-05 DIAGNOSIS — I10 ESSENTIAL HYPERTENSION: ICD-10-CM

## 2023-07-05 DIAGNOSIS — J44.9 MILD CHRONIC OBSTRUCTIVE PULMONARY DISEASE: ICD-10-CM

## 2023-07-05 DIAGNOSIS — Z00.00 ANNUAL PHYSICAL EXAM: Primary | ICD-10-CM

## 2023-07-05 DIAGNOSIS — E78.5 HYPERLIPIDEMIA, UNSPECIFIED HYPERLIPIDEMIA TYPE: ICD-10-CM

## 2023-07-05 PROCEDURE — 1160F RVW MEDS BY RX/DR IN RCRD: CPT | Mod: CPTII,S$GLB,, | Performed by: FAMILY MEDICINE

## 2023-07-05 PROCEDURE — 3288F PR FALLS RISK ASSESSMENT DOCUMENTED: ICD-10-PCS | Mod: CPTII,S$GLB,, | Performed by: FAMILY MEDICINE

## 2023-07-05 PROCEDURE — 99999 PR PBB SHADOW E&M-EST. PATIENT-LVL IV: CPT | Mod: PBBFAC,,, | Performed by: FAMILY MEDICINE

## 2023-07-05 PROCEDURE — 3074F PR MOST RECENT SYSTOLIC BLOOD PRESSURE < 130 MM HG: ICD-10-PCS | Mod: CPTII,S$GLB,, | Performed by: FAMILY MEDICINE

## 2023-07-05 PROCEDURE — 99397 PR PREVENTIVE VISIT,EST,65 & OVER: ICD-10-PCS | Mod: S$GLB,,, | Performed by: FAMILY MEDICINE

## 2023-07-05 PROCEDURE — 99397 PER PM REEVAL EST PAT 65+ YR: CPT | Mod: S$GLB,,, | Performed by: FAMILY MEDICINE

## 2023-07-05 PROCEDURE — 1101F PR PT FALLS ASSESS DOC 0-1 FALLS W/OUT INJ PAST YR: ICD-10-PCS | Mod: CPTII,S$GLB,, | Performed by: FAMILY MEDICINE

## 2023-07-05 PROCEDURE — 3078F DIAST BP <80 MM HG: CPT | Mod: CPTII,S$GLB,, | Performed by: FAMILY MEDICINE

## 2023-07-05 PROCEDURE — 1101F PT FALLS ASSESS-DOCD LE1/YR: CPT | Mod: CPTII,S$GLB,, | Performed by: FAMILY MEDICINE

## 2023-07-05 PROCEDURE — 1160F PR REVIEW ALL MEDS BY PRESCRIBER/CLIN PHARMACIST DOCUMENTED: ICD-10-PCS | Mod: CPTII,S$GLB,, | Performed by: FAMILY MEDICINE

## 2023-07-05 PROCEDURE — 3288F FALL RISK ASSESSMENT DOCD: CPT | Mod: CPTII,S$GLB,, | Performed by: FAMILY MEDICINE

## 2023-07-05 PROCEDURE — 1126F PR PAIN SEVERITY QUANTIFIED, NO PAIN PRESENT: ICD-10-PCS | Mod: CPTII,S$GLB,, | Performed by: FAMILY MEDICINE

## 2023-07-05 PROCEDURE — 3074F SYST BP LT 130 MM HG: CPT | Mod: CPTII,S$GLB,, | Performed by: FAMILY MEDICINE

## 2023-07-05 PROCEDURE — 99999 PR PBB SHADOW E&M-EST. PATIENT-LVL IV: ICD-10-PCS | Mod: PBBFAC,,, | Performed by: FAMILY MEDICINE

## 2023-07-05 PROCEDURE — 1159F MED LIST DOCD IN RCRD: CPT | Mod: CPTII,S$GLB,, | Performed by: FAMILY MEDICINE

## 2023-07-05 PROCEDURE — 3078F PR MOST RECENT DIASTOLIC BLOOD PRESSURE < 80 MM HG: ICD-10-PCS | Mod: CPTII,S$GLB,, | Performed by: FAMILY MEDICINE

## 2023-07-05 PROCEDURE — 1126F AMNT PAIN NOTED NONE PRSNT: CPT | Mod: CPTII,S$GLB,, | Performed by: FAMILY MEDICINE

## 2023-07-05 PROCEDURE — 1159F PR MEDICATION LIST DOCUMENTED IN MEDICAL RECORD: ICD-10-PCS | Mod: CPTII,S$GLB,, | Performed by: FAMILY MEDICINE

## 2023-07-05 RX ORDER — VALSARTAN 80 MG/1
80 TABLET ORAL DAILY
Qty: 90 TABLET | Refills: 3 | Status: SHIPPED | OUTPATIENT
Start: 2023-07-05 | End: 2024-07-04

## 2023-07-05 RX ORDER — PRAVASTATIN SODIUM 40 MG/1
40 TABLET ORAL DAILY
Qty: 90 TABLET | Refills: 3 | Status: SHIPPED | OUTPATIENT
Start: 2023-07-05 | End: 2024-07-04

## 2023-07-05 NOTE — PROGRESS NOTES
[facility] - Clinic Note    Subjective      Mr. Renee is a 77 y.o. male who presents to clinic for Follow-up (refills)    Patient has a history of hypertension, hyperlipidemia, benign prosthetic hyperplasia, allergies.  His conditions are well controlled.  Blood pressure has been controlled.  Denies any chest pain, blurry vision, headaches.  Is followed by cardiology with Dr. Sanchez.  Also is followed by urology for his benign prostatic hyperplasia.  Overall has been feeling well.    PMH Madi has a past medical history of High cholesterol, Hypertension, and Vertigo.   PSXH Madi has a past surgical history that includes pallup removal; Spinal fusion; Shoulder surgery (Right); and Colon surgery.   FH Madi's family history includes Cancer in his mother; Hypertension in his father and mother; Stroke in his father; Vision loss in his mother.   SH Madi reports that he quit smoking about 16 years ago. His smoking use included cigarettes. He started smoking about 59 years ago. He has a 30.00 pack-year smoking history. He has never used smokeless tobacco. He reports that he does not drink alcohol and does not use drugs.   ALG Madi is allergic to lisinopril and grass pollen-june grass standard.   MED Madi has a current medication list which includes the following prescription(s): fexofenadine, finasteride, fluticasone propionate, tamsulosin, pravastatin, and valsartan.     Review of Systems   Constitutional:  Negative for chills and fever.   HENT:  Negative for congestion and rhinorrhea.    Eyes:  Negative for visual disturbance.   Respiratory:  Negative for cough and shortness of breath.    Cardiovascular:  Negative for chest pain.   Gastrointestinal:  Negative for abdominal pain, constipation, diarrhea, nausea and vomiting.   Genitourinary:  Negative for dysuria.   Musculoskeletal:  Negative for myalgias.   Skin:  Negative for rash.   Neurological:  Negative for weakness and headaches.   Objective     /66 (BP  "Location: Left arm, Patient Position: Sitting, BP Method: Large (Manual))   Pulse (!) 58   Temp 97.9 °F (36.6 °C) (Temporal)   Resp 16   Ht 5' 9" (1.753 m)   Wt 76.3 kg (168 lb 3.2 oz)   SpO2 96%   BMI 24.84 kg/m²     Physical Exam  Vitals and nursing note reviewed.   Constitutional:       General: He is not in acute distress.     Appearance: Normal appearance. He is well-developed. He is not diaphoretic.   HENT:      Head: Normocephalic and atraumatic.      Right Ear: External ear normal.      Left Ear: External ear normal.   Eyes:      General:         Right eye: No discharge.         Left eye: No discharge.   Cardiovascular:      Rate and Rhythm: Normal rate and regular rhythm.      Heart sounds: Murmur heard.   Pulmonary:      Effort: Pulmonary effort is normal.      Breath sounds: Normal breath sounds. No wheezing or rales.   Skin:     General: Skin is warm and dry.   Neurological:      Mental Status: He is alert and oriented to person, place, and time. Mental status is at baseline.   Psychiatric:         Mood and Affect: Mood normal.         Behavior: Behavior normal.         Thought Content: Thought content normal.         Judgment: Judgment normal.      Assessment/Plan     1. Annual physical exam        2. Hyperlipidemia, unspecified hyperlipidemia type  pravastatin (PRAVACHOL) 40 MG tablet      3. Essential hypertension  valsartan (DIOVAN) 80 MG tablet      4. Mild chronic obstructive pulmonary disease          Overall doing well.  Continue current medication as prescribed. Follow-up in 1 year.    Future Appointments   Date Time Provider Department Center   7/17/2024 10:00 AM Grover Correa MD [facility]         Grover Correa MD  Family Medicine  [facility]                "

## 2023-08-17 ENCOUNTER — OFFICE VISIT (OUTPATIENT)
Dept: FAMILY MEDICINE | Facility: CLINIC | Age: 78
End: 2023-08-17
Payer: MEDICARE

## 2023-08-17 VITALS
WEIGHT: 164.81 LBS | DIASTOLIC BLOOD PRESSURE: 60 MMHG | SYSTOLIC BLOOD PRESSURE: 110 MMHG | BODY MASS INDEX: 24.41 KG/M2 | OXYGEN SATURATION: 97 % | HEIGHT: 69 IN | HEART RATE: 57 BPM

## 2023-08-17 DIAGNOSIS — J01.00 ACUTE NON-RECURRENT MAXILLARY SINUSITIS: Primary | ICD-10-CM

## 2023-08-17 PROCEDURE — 3078F PR MOST RECENT DIASTOLIC BLOOD PRESSURE < 80 MM HG: ICD-10-PCS | Mod: CPTII,S$GLB,, | Performed by: FAMILY MEDICINE

## 2023-08-17 PROCEDURE — 3074F PR MOST RECENT SYSTOLIC BLOOD PRESSURE < 130 MM HG: ICD-10-PCS | Mod: CPTII,S$GLB,, | Performed by: FAMILY MEDICINE

## 2023-08-17 PROCEDURE — 99999 PR PBB SHADOW E&M-EST. PATIENT-LVL III: ICD-10-PCS | Mod: PBBFAC,,, | Performed by: FAMILY MEDICINE

## 2023-08-17 PROCEDURE — 1159F PR MEDICATION LIST DOCUMENTED IN MEDICAL RECORD: ICD-10-PCS | Mod: CPTII,S$GLB,, | Performed by: FAMILY MEDICINE

## 2023-08-17 PROCEDURE — 3288F PR FALLS RISK ASSESSMENT DOCUMENTED: ICD-10-PCS | Mod: CPTII,S$GLB,, | Performed by: FAMILY MEDICINE

## 2023-08-17 PROCEDURE — 99214 PR OFFICE/OUTPT VISIT, EST, LEVL IV, 30-39 MIN: ICD-10-PCS | Mod: S$GLB,,, | Performed by: FAMILY MEDICINE

## 2023-08-17 PROCEDURE — 3288F FALL RISK ASSESSMENT DOCD: CPT | Mod: CPTII,S$GLB,, | Performed by: FAMILY MEDICINE

## 2023-08-17 PROCEDURE — 99214 OFFICE O/P EST MOD 30 MIN: CPT | Mod: S$GLB,,, | Performed by: FAMILY MEDICINE

## 2023-08-17 PROCEDURE — 3078F DIAST BP <80 MM HG: CPT | Mod: CPTII,S$GLB,, | Performed by: FAMILY MEDICINE

## 2023-08-17 PROCEDURE — 3074F SYST BP LT 130 MM HG: CPT | Mod: CPTII,S$GLB,, | Performed by: FAMILY MEDICINE

## 2023-08-17 PROCEDURE — 1101F PT FALLS ASSESS-DOCD LE1/YR: CPT | Mod: CPTII,S$GLB,, | Performed by: FAMILY MEDICINE

## 2023-08-17 PROCEDURE — 1126F AMNT PAIN NOTED NONE PRSNT: CPT | Mod: CPTII,S$GLB,, | Performed by: FAMILY MEDICINE

## 2023-08-17 PROCEDURE — 1159F MED LIST DOCD IN RCRD: CPT | Mod: CPTII,S$GLB,, | Performed by: FAMILY MEDICINE

## 2023-08-17 PROCEDURE — 1126F PR PAIN SEVERITY QUANTIFIED, NO PAIN PRESENT: ICD-10-PCS | Mod: CPTII,S$GLB,, | Performed by: FAMILY MEDICINE

## 2023-08-17 PROCEDURE — 1101F PR PT FALLS ASSESS DOC 0-1 FALLS W/OUT INJ PAST YR: ICD-10-PCS | Mod: CPTII,S$GLB,, | Performed by: FAMILY MEDICINE

## 2023-08-17 PROCEDURE — 99999 PR PBB SHADOW E&M-EST. PATIENT-LVL III: CPT | Mod: PBBFAC,,, | Performed by: FAMILY MEDICINE

## 2023-08-17 RX ORDER — PROMETHAZINE HYDROCHLORIDE AND DEXTROMETHORPHAN HYDROBROMIDE 6.25; 15 MG/5ML; MG/5ML
5 SYRUP ORAL EVERY 4 HOURS PRN
Qty: 118 ML | Refills: 0 | Status: SHIPPED | OUTPATIENT
Start: 2023-08-17 | End: 2023-08-27

## 2023-08-17 RX ORDER — PREDNISOLONE ACETATE 10 MG/ML
SUSPENSION/ DROPS OPHTHALMIC
COMMUNITY
Start: 2023-08-15

## 2023-08-17 RX ORDER — DORZOLAMIDE HYDROCHLORIDE AND TIMOLOL MALEATE 20; 5 MG/ML; MG/ML
SOLUTION/ DROPS OPHTHALMIC
COMMUNITY
Start: 2023-08-03

## 2023-08-17 RX ORDER — AMOXICILLIN AND CLAVULANATE POTASSIUM 875; 125 MG/1; MG/1
1 TABLET, FILM COATED ORAL EVERY 12 HOURS
Qty: 20 TABLET | Refills: 0 | Status: SHIPPED | OUTPATIENT
Start: 2023-08-17 | End: 2023-08-27

## 2023-08-17 RX ORDER — BRIMONIDINE TARTRATE 2 MG/ML
SOLUTION/ DROPS OPHTHALMIC
COMMUNITY
Start: 2023-07-19

## 2023-08-17 NOTE — PROGRESS NOTES
[facility] - Clinic Note    Subjective      Mr. Renee is a 78 y.o. male who presents to clinic with complaints of a cough.     Reports a cough for the past 4 days.   Productive cough with white phlegm, nasal congestion, and post-nasal drip.   Has been taking allegra and flonase with no relief.     PMH Madi has a past medical history of High cholesterol, Hypertension, and Vertigo.   PSXH Madi has a past surgical history that includes pallup removal; Spinal fusion; Shoulder surgery (Right); and Colon surgery.   FH Madi's family history includes Cancer in his mother; Hypertension in his father and mother; Stroke in his father; Vision loss in his mother.   SH Madi reports that he quit smoking about 17 years ago. His smoking use included cigarettes. He started smoking about 59 years ago. He has a 42.3 pack-year smoking history. He has never used smokeless tobacco. He reports that he does not drink alcohol and does not use drugs.   ALG Madi is allergic to lisinopril and grass pollen-jorge grass standard.   MED Madi has a current medication list which includes the following prescription(s): brimonidine 0.2%, dorzolamide-timolol 2-0.5%, fexofenadine, finasteride, fluticasone propionate, pravastatin, prednisolone acetate, tamsulosin, valsartan, amoxicillin-clavulanate 875-125mg, and promethazine-dextromethorphan.     Review of Systems   Constitutional:  Negative for activity change, appetite change, chills, fatigue and fever.   HENT:  Positive for congestion, postnasal drip, rhinorrhea, sinus pressure and sore throat. Negative for ear discharge, ear pain and sinus pain.    Eyes:  Negative for visual disturbance.   Respiratory:  Positive for cough. Negative for chest tightness, shortness of breath and wheezing.    Cardiovascular:  Negative for chest pain, palpitations and leg swelling.   Gastrointestinal:  Negative for abdominal pain, nausea and vomiting.   Skin:  Negative for wound.   Neurological:  Negative for  "dizziness and headaches.   Psychiatric/Behavioral:  Negative for confusion.      Objective     /60 (BP Location: Left arm, Patient Position: Sitting, BP Method: Medium (Manual))   Pulse (!) 57   Ht 5' 9" (1.753 m)   Wt 74.8 kg (164 lb 12.8 oz)   SpO2 97%   BMI 24.34 kg/m²     Physical Exam   Constitutional: normal appearance.  Non-toxic appearance. No distress. He does not appear ill.   HENT:   Head: Normocephalic and atraumatic.   Right Ear: Tympanic membrane, external ear and ear canal normal.   Left Ear: Tympanic membrane, external ear and ear canal normal.   Nose: Nasal congestion present.   Mouth/Throat: Mucous membranes are moist. No oropharyngeal exudate or posterior oropharyngeal erythema. Oropharynx is clear.   Eyes: Right eye exhibits no discharge. Left eye exhibits no discharge.   Cardiovascular: Normal rate, regular rhythm, normal heart sounds and normal pulses. Exam reveals no gallop and no friction rub.   No murmur heard.Pulmonary:      Effort: Pulmonary effort is normal. No respiratory distress.      Breath sounds: Normal breath sounds. No wheezing, rhonchi or rales.     Abdominal: Normal appearance.   Musculoskeletal:      Right lower leg: No edema.      Left lower leg: No edema.   Lymphadenopathy:     He has no cervical adenopathy.   Neurological: He is alert.   Skin: Skin is warm and dry. Capillary refill takes less than 2 seconds. He is not diaphoretic.   Psychiatric: His behavior is normal. Mood, judgment and thought content normal.   Vitals reviewed.     Assessment/Plan     Madi was seen today for sinusitis.    Diagnoses and all orders for this visit:  -New patient and new problem to me    Acute non-recurrent maxillary sinusitis  -     amoxicillin-clavulanate 875-125mg (AUGMENTIN) 875-125 mg per tablet; Take 1 tablet by mouth every 12 (twelve) hours. for 10 days  -     promethazine-dextromethorphan (PROMETHAZINE-DM) 6.25-15 mg/5 mL Syrp; Take 5 mLs by mouth every 4 (four) hours as " needed (cough).        Follow up if symptoms worsen or fail to improve.    Future Appointments   Date Time Provider Department Center   8/30/2023 11:00 AM [facility]   7/17/2024 10:00 AM [facility] MD [facility] MD  Family Medicine  [facility]

## 2023-10-20 RX ORDER — FLUTICASONE PROPIONATE 50 MCG
2 SPRAY, SUSPENSION (ML) NASAL DAILY
Qty: 9.9 ML | Refills: 11 | Status: SHIPPED | OUTPATIENT
Start: 2023-10-20

## 2023-10-20 RX ORDER — MINERAL OIL
180 ENEMA (ML) RECTAL DAILY
Qty: 90 TABLET | Refills: 0 | Status: SHIPPED | OUTPATIENT
Start: 2023-10-20 | End: 2024-01-23 | Stop reason: SDUPTHER

## 2023-11-27 ENCOUNTER — OFFICE VISIT (OUTPATIENT)
Dept: UROLOGY | Facility: CLINIC | Age: 78
End: 2023-11-27
Payer: MEDICARE

## 2023-11-27 VITALS — WEIGHT: 164 LBS | HEIGHT: 69 IN | BODY MASS INDEX: 24.29 KG/M2

## 2023-11-27 DIAGNOSIS — N40.1 BENIGN PROSTATIC HYPERPLASIA WITH WEAK URINARY STREAM: Primary | ICD-10-CM

## 2023-11-27 DIAGNOSIS — R39.12 BENIGN PROSTATIC HYPERPLASIA WITH WEAK URINARY STREAM: Primary | ICD-10-CM

## 2023-11-27 DIAGNOSIS — R97.20 ELEVATED PSA: ICD-10-CM

## 2023-11-27 PROCEDURE — 3288F PR FALLS RISK ASSESSMENT DOCUMENTED: ICD-10-PCS | Mod: CPTII,S$GLB,, | Performed by: UROLOGY

## 2023-11-27 PROCEDURE — 1159F PR MEDICATION LIST DOCUMENTED IN MEDICAL RECORD: ICD-10-PCS | Mod: CPTII,S$GLB,, | Performed by: UROLOGY

## 2023-11-27 PROCEDURE — 1126F AMNT PAIN NOTED NONE PRSNT: CPT | Mod: CPTII,S$GLB,, | Performed by: UROLOGY

## 2023-11-27 PROCEDURE — 3288F FALL RISK ASSESSMENT DOCD: CPT | Mod: CPTII,S$GLB,, | Performed by: UROLOGY

## 2023-11-27 PROCEDURE — 1159F MED LIST DOCD IN RCRD: CPT | Mod: CPTII,S$GLB,, | Performed by: UROLOGY

## 2023-11-27 PROCEDURE — 1101F PR PT FALLS ASSESS DOC 0-1 FALLS W/OUT INJ PAST YR: ICD-10-PCS | Mod: CPTII,S$GLB,, | Performed by: UROLOGY

## 2023-11-27 PROCEDURE — 1126F PR PAIN SEVERITY QUANTIFIED, NO PAIN PRESENT: ICD-10-PCS | Mod: CPTII,S$GLB,, | Performed by: UROLOGY

## 2023-11-27 PROCEDURE — 99205 OFFICE O/P NEW HI 60 MIN: CPT | Mod: S$GLB,,, | Performed by: UROLOGY

## 2023-11-27 PROCEDURE — 1160F RVW MEDS BY RX/DR IN RCRD: CPT | Mod: CPTII,S$GLB,, | Performed by: UROLOGY

## 2023-11-27 PROCEDURE — 99999 PR PBB SHADOW E&M-EST. PATIENT-LVL III: CPT | Mod: PBBFAC,,, | Performed by: UROLOGY

## 2023-11-27 PROCEDURE — 1101F PT FALLS ASSESS-DOCD LE1/YR: CPT | Mod: CPTII,S$GLB,, | Performed by: UROLOGY

## 2023-11-27 PROCEDURE — 1160F PR REVIEW ALL MEDS BY PRESCRIBER/CLIN PHARMACIST DOCUMENTED: ICD-10-PCS | Mod: CPTII,S$GLB,, | Performed by: UROLOGY

## 2023-11-27 PROCEDURE — 99205 PR OFFICE/OUTPT VISIT, NEW, LEVL V, 60-74 MIN: ICD-10-PCS | Mod: S$GLB,,, | Performed by: UROLOGY

## 2023-11-27 PROCEDURE — 99999 PR PBB SHADOW E&M-EST. PATIENT-LVL III: ICD-10-PCS | Mod: PBBFAC,,, | Performed by: UROLOGY

## 2023-11-27 RX ORDER — TAMSULOSIN HYDROCHLORIDE 0.4 MG/1
0.8 CAPSULE ORAL DAILY
Qty: 180 CAPSULE | Refills: 3 | Status: SHIPPED | OUTPATIENT
Start: 2023-11-27 | End: 2024-11-26

## 2023-11-27 RX ORDER — FINASTERIDE 5 MG/1
5 TABLET, FILM COATED ORAL DAILY
Qty: 90 TABLET | Refills: 3 | Status: SHIPPED | OUTPATIENT
Start: 2023-11-27 | End: 2024-11-26

## 2023-11-27 NOTE — PROGRESS NOTES
[facility] Urology New Patient/H&P:    Madi Renee is a 78 y.o. male who presents for lower urinary tract symptoms.    Patient with a history of HTN and LUTS who presents to [facility] care.     He reports a several year history of lower urinary tract symptoms - incomplete emptying, intermittency, weak stream, urgency and straining previously managed by Dr. Charles Vargas.     He is on Flomax 0.4 mg and Finasteride 5 mg. Only on Finasteride 3 times weekly. 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 Not significantly bothered by his symptoms.     Per patient, he had balanitis in the past that was refractory to topical therapies. He underwent circumcision with Dr. Vargas. Patient states that he believes the pathology resulted skin cancer, but no record available. He has not had any therapies thereafter.     Urine dipstick with trace leukocytes today.     Denies any fever, chills, gross hematuria, flank pain, bone pain, unintentional weight loss,  trauma or history of  malignancy.       PSA*  2.2  2/22/23  2.6  8/20/21  1.7  11/12/18    A1C  5.4  5/19/22    IPSS QoL  22 3 11/27/23    PVR  14 mL  11/27/23      *Finasteride      Past Medical History:   Diagnosis Date    High cholesterol     Hypertension     Vertigo        Past Surgical History:   Procedure Laterality Date    COLON SURGERY      pallup removal      SHOULDER SURGERY Right     SPINAL FUSION         Family History   Problem Relation Age of Onset    Cancer Mother     Hypertension Mother     Vision loss Mother     Hypertension Father     Stroke Father        Review of patient's allergies indicates:   Allergen Reactions    Lisinopril Anaphylaxis     Other reaction(s): Cough  PT REQUEST REMOVAL OF THIS MEDICATION    Grass pollen-june grass standard Other (See Comments)       Medications Reviewed: see MAR      FOCUSED PHYSICAL EXAM:    There were no vitals filed for this visit.  Body mass  "index is 24.22 kg/m². Weight: 74.4 kg (164 lb) Height: 5' 9" (175.3 cm)       General: Alert, cooperative, no distress, appears stated age  Abdomen: Soft, non-tender, no CVA tenderness, non-distended  : Meatus patent, circumcised, no lesions        LABS:    No results found for this or any previous visit (from the past 336 hour(s)).          Assessment/Diagnosis:    1. Benign prostatic hyperplasia with weak urinary stream  tamsulosin (FLOMAX) 0.4 mg Cap    finasteride (PROSCAR) 5 mg tablet      2. Elevated PSA  Prostate Specific Antigen, Diagnostic          Plans:    - I spent 60 minutes of the day of this encounter preparing for, treating and managing this patient. Extensive discussion with patient regarding the etiology and management of his lower urinary tract symptoms. Explained that LUTS are multifactorial and can be secondary to an enlarged prostate, PO intake of bladder irritants, overactive bladder, constipation, malignancy, trauma, infection, stones or medications.   - We discussed the benefit of cystoscopy and TRUS given his LUTS. States he is uninterested at this time and prefers to continue with Flomax and Finasteride.   - Flomax increased to 0.8 mg PO daily - refills ordered.   - Continue Finasteride 5 mg PO daily - refills ordered.   - The natural history of prostate cancer and ongoing controversy regarding screening and potential treatment outcomes of prostate cancer has been discussed with the patient. The meaning of a false positive PSA and a false negative PSA has been discussed. He indicates understanding of the limitations of this screening test.   - PSA in 2/2024. May consider MRI prostate if rising as his PSA has been elevated when adjusted for Finasteride usage 3 times weekly.   - Obtain records from previous urologist Dr. Charles Vargas.   - RTC in 1 year with symptom score and PVR if PSA stable.       "

## 2023-12-08 ENCOUNTER — OFFICE VISIT (OUTPATIENT)
Dept: FAMILY MEDICINE | Facility: CLINIC | Age: 78
End: 2023-12-08
Payer: MEDICARE

## 2023-12-08 ENCOUNTER — LAB VISIT (OUTPATIENT)
Dept: LAB | Facility: HOSPITAL | Age: 78
End: 2023-12-08
Attending: FAMILY MEDICINE
Payer: MEDICARE

## 2023-12-08 VITALS
HEART RATE: 62 BPM | WEIGHT: 164.88 LBS | BODY MASS INDEX: 24.42 KG/M2 | DIASTOLIC BLOOD PRESSURE: 62 MMHG | SYSTOLIC BLOOD PRESSURE: 132 MMHG | OXYGEN SATURATION: 99 % | HEIGHT: 69 IN | RESPIRATION RATE: 19 BRPM

## 2023-12-08 DIAGNOSIS — Z86.19 H/O COLD SORES: ICD-10-CM

## 2023-12-08 DIAGNOSIS — Z86.19 H/O COLD SORES: Primary | ICD-10-CM

## 2023-12-08 PROCEDURE — 86694 HERPES SIMPLEX NES ANTBDY: CPT | Performed by: FAMILY MEDICINE

## 2023-12-08 PROCEDURE — 1159F MED LIST DOCD IN RCRD: CPT | Mod: CPTII,S$GLB,, | Performed by: FAMILY MEDICINE

## 2023-12-08 PROCEDURE — 3078F PR MOST RECENT DIASTOLIC BLOOD PRESSURE < 80 MM HG: ICD-10-PCS | Mod: CPTII,S$GLB,, | Performed by: FAMILY MEDICINE

## 2023-12-08 PROCEDURE — 36415 COLL VENOUS BLD VENIPUNCTURE: CPT | Performed by: FAMILY MEDICINE

## 2023-12-08 PROCEDURE — 3288F PR FALLS RISK ASSESSMENT DOCUMENTED: ICD-10-PCS | Mod: CPTII,S$GLB,, | Performed by: FAMILY MEDICINE

## 2023-12-08 PROCEDURE — 1159F PR MEDICATION LIST DOCUMENTED IN MEDICAL RECORD: ICD-10-PCS | Mod: CPTII,S$GLB,, | Performed by: FAMILY MEDICINE

## 2023-12-08 PROCEDURE — 99214 OFFICE O/P EST MOD 30 MIN: CPT | Mod: S$GLB,,, | Performed by: FAMILY MEDICINE

## 2023-12-08 PROCEDURE — 3078F DIAST BP <80 MM HG: CPT | Mod: CPTII,S$GLB,, | Performed by: FAMILY MEDICINE

## 2023-12-08 PROCEDURE — 3075F PR MOST RECENT SYSTOLIC BLOOD PRESS GE 130-139MM HG: ICD-10-PCS | Mod: CPTII,S$GLB,, | Performed by: FAMILY MEDICINE

## 2023-12-08 PROCEDURE — 1125F AMNT PAIN NOTED PAIN PRSNT: CPT | Mod: CPTII,S$GLB,, | Performed by: FAMILY MEDICINE

## 2023-12-08 PROCEDURE — 3075F SYST BP GE 130 - 139MM HG: CPT | Mod: CPTII,S$GLB,, | Performed by: FAMILY MEDICINE

## 2023-12-08 PROCEDURE — 1101F PR PT FALLS ASSESS DOC 0-1 FALLS W/OUT INJ PAST YR: ICD-10-PCS | Mod: CPTII,S$GLB,, | Performed by: FAMILY MEDICINE

## 2023-12-08 PROCEDURE — 99999 PR PBB SHADOW E&M-EST. PATIENT-LVL III: ICD-10-PCS | Mod: PBBFAC,,, | Performed by: FAMILY MEDICINE

## 2023-12-08 PROCEDURE — 1101F PT FALLS ASSESS-DOCD LE1/YR: CPT | Mod: CPTII,S$GLB,, | Performed by: FAMILY MEDICINE

## 2023-12-08 PROCEDURE — 3288F FALL RISK ASSESSMENT DOCD: CPT | Mod: CPTII,S$GLB,, | Performed by: FAMILY MEDICINE

## 2023-12-08 PROCEDURE — 99214 PR OFFICE/OUTPT VISIT, EST, LEVL IV, 30-39 MIN: ICD-10-PCS | Mod: S$GLB,,, | Performed by: FAMILY MEDICINE

## 2023-12-08 PROCEDURE — 99999 PR PBB SHADOW E&M-EST. PATIENT-LVL III: CPT | Mod: PBBFAC,,, | Performed by: FAMILY MEDICINE

## 2023-12-08 PROCEDURE — 86787 VARICELLA-ZOSTER ANTIBODY: CPT | Performed by: FAMILY MEDICINE

## 2023-12-08 PROCEDURE — 1125F PR PAIN SEVERITY QUANTIFIED, PAIN PRESENT: ICD-10-PCS | Mod: CPTII,S$GLB,, | Performed by: FAMILY MEDICINE

## 2023-12-08 RX ORDER — VALACYCLOVIR HYDROCHLORIDE 1 G/1
1000 TABLET, FILM COATED ORAL 2 TIMES DAILY
Qty: 20 TABLET | Refills: 2 | Status: SHIPPED | OUTPATIENT
Start: 2023-12-08 | End: 2024-12-07

## 2023-12-08 RX ORDER — DOCOSANOL 100 MG/G
CREAM TOPICAL
COMMUNITY

## 2023-12-08 NOTE — PROGRESS NOTES
Subjective:       Patient ID: Madi Renee is a 78 y.o. male.    Chief Complaint: Mouth Lesions    Mr Renee is a 79 yo male here with cold sores on his upper and lower lips. He has a history of cold sores. He has HTN, Hyperlipidemia, and COPD    Mouth Lesions   Associated symptoms include mouth sores.     Review of Systems   HENT:  Positive for mouth sores.        Patient Active Problem List   Diagnosis    Benign prostatic hyperplasia without lower urinary tract symptoms    Hyperlipidemia    Essential hypertension    Peroneal tendonitis, unspecified laterality    Mild chronic obstructive pulmonary disease    H/O cold sores       Objective:      Physical Exam  Vitals and nursing note reviewed.   Constitutional:       Appearance: Normal appearance. He is normal weight.   HENT:      Head: Normocephalic.      Nose: Nose normal.   Eyes:      Extraocular Movements: Extraocular movements intact.      Conjunctiva/sclera: Conjunctivae normal.      Pupils: Pupils are equal, round, and reactive to light.   Pulmonary:      Effort: Pulmonary effort is normal. No respiratory distress.      Breath sounds: Normal breath sounds.   Musculoskeletal:         General: Normal range of motion.      Cervical back: Normal range of motion and neck supple.   Skin:     General: Skin is warm and dry.      Findings: Lesion present.      Comments: Cold sores to upper and lower vermillion border x 5   Neurological:      General: No focal deficit present.      Mental Status: He is alert and oriented to person, place, and time.   Psychiatric:         Mood and Affect: Mood normal.         Behavior: Behavior normal.         Lab Results   Component Value Date    WBC 5.32 05/19/2022    HGB 15.7 05/19/2022    HCT 45.9 05/19/2022     05/19/2022    CHOL 154 05/19/2022    TRIG 67 05/19/2022    HDL 40 05/19/2022    ALT 24 05/19/2022    AST 24 05/19/2022     05/19/2022    K 4.2 05/19/2022     05/19/2022    CREATININE 0.9 05/19/2022     "BUN 11 05/19/2022    CO2 25 05/19/2022    PSA 2.2 02/22/2023    HGBA1C 5.4 05/19/2022     The 10-year ASCVD risk score (Velia DAVIES, et al., 2019) is: 25.1%    Values used to calculate the score:      Age: 78 years      Sex: Male      Is Non- : Yes      Diabetic: No      Tobacco smoker: No      Systolic Blood Pressure: 132 mmHg      Is BP treated: Yes      HDL Cholesterol: 40 mg/dL      Total Cholesterol: 154 mg/dL  Visit Vitals  /62 (BP Location: Left arm, Patient Position: Sitting, BP Method: Medium (Manual))   Pulse 62   Resp 19   Ht 5' 9" (1.753 m)   Wt 74.8 kg (164 lb 14.4 oz)   SpO2 99%   BMI 24.35 kg/m²      Assessment:       1. H/O cold sores        Plan:       1. H/O cold sores  Overview:  Multiple cold sores on upper and lower lips    Orders:  -     valACYclovir (VALTREX) 1000 MG tablet; Take 1 tablet (1,000 mg total) by mouth 2 (two) times daily.  Dispense: 20 tablet; Refill: 2  -     HSV 1 & 2, IgM; Future; Expected date: 12/08/2023  -     Varicella Zoster Antibody, IgG; Future; Expected date: 12/08/2023       Follow up if symptoms worsen or fail to improve.      Future Appointments       Date Provider Specialty Appt Notes    1/22/2024 Inna Martines MD Family Medicine Establish Care    2/5/2024  Lab psa             "

## 2023-12-11 LAB
HSV AB, IGM BY EIA: 0.27 INDEX
VARICELLA INTERPRETATION: POSITIVE
VARICELLA ZOSTER IGG: 2397 AU/ML

## 2023-12-12 ENCOUNTER — TELEPHONE (OUTPATIENT)
Dept: FAMILY MEDICINE | Facility: CLINIC | Age: 78
End: 2023-12-12
Payer: MEDICARE

## 2024-01-21 NOTE — PROGRESS NOTES
Subjective:       Patient ID: Madi Renee is a 78 y.o. male.    Chief Complaint: Establish Care and Cough    Madi Renee is a 78-year-old male with BPH, hyperlipidemia, hypertension, and COPD.  He is here for his routine follow-up. He states he has had a cough on and off for a few weeks. He is coughing up some phlem that whitish in color. He also sinus congestion. He is a former smoker , he quit 22 years ago. He does have aortic stenosis and Sees Dr Guardado, cardiology at [facility]. He has chronic bradycardia, and is usually in the 50'-60's. He has recently seen his cardiologist and has a routine follow up scheduled.  His last echo is as follows:    Assessment and Plan    Extracted from:  Assessment and Plan  Title: Camron Sanchez MD: Cardiology Office Visit Note Author: Margaret Centeno NP Date: 10/19/23    Assessment and Plan  1. Aortic stenosis (Nonrheumatic aortic (valve) stenosis, I35.0) .  Aortic stenosis remains mild on 2D echocardiogram completed yesterday.Peak gradient 37 mmHg, mean gradient 18 mmHg.  Essentially unchanged from previous study.  Ordered:  REBECA [id number] Office Outpatient Visit Est, 10/19/23 9:48:00 CDT     2. Hypertension (Essential (primary) hypertension, I10) .  Blood pressure is well controlled on current regimen. The patient is tolerating medications without significant side effects. We will continue current medications as prescribed.  Lifestyle modifications reinforced. All questions have been addressed.     Patient request deferring updated lab work to new PCP as he will be established soon with a new primary care physician.        Ordered:  REBECA [id number] Office Outpatient Visit Est, 10/19/23 9:48:00 CDT     3. Mixed hyperlipidemia (Mixed hyperlipidemia, E78.2) .  Pt tolerating statin therapy without significant side effects. Follow lipid panel and LFTs every 6 mos as indicated. AHA diet and exercise recommendations reinforced.  Ordered:  REBECA [id number] Office Outpatient Visit Est,  10/19/23 9:48:00 CDT           His last labs were done 05/19/2022 and have been reviewed.  The results are as follows:    1. CBC within normal limits   2. CMP remarkable only for total bilirubin 1.4   3. Lipid panel with a total cholesterol 154, HDL 40, triglycerides 67, .6 and a total cholesterol/HDL ratio of 3.9  4. Diabetes 5.4% with an average glucose of 108   5. PSA 2.2     Review of Systems   HENT:  Positive for postnasal drip, rhinorrhea and sinus pressure.    Respiratory:  Positive for cough.    Cardiovascular:         Chronic bradycardia       Patient Active Problem List   Diagnosis    Benign prostatic hyperplasia without lower urinary tract symptoms    Hyperlipidemia    Essential hypertension    Peroneal tendonitis, unspecified laterality    Mild chronic obstructive pulmonary disease    H/O cold sores       Objective:      Physical Exam  Vitals and nursing note reviewed.   Constitutional:       Appearance: Normal appearance. He is normal weight.   HENT:      Head: Normocephalic.      Right Ear: Tympanic membrane normal.      Left Ear: Tympanic membrane normal.      Nose: Congestion present.      Comments: Boggy turbinates  Eyes:      Extraocular Movements: Extraocular movements intact.      Conjunctiva/sclera: Conjunctivae normal.      Pupils: Pupils are equal, round, and reactive to light.   Cardiovascular:      Rate and Rhythm: Regular rhythm. Bradycardia present.      Heart sounds: Normal heart sounds. No murmur heard.  Pulmonary:      Effort: Pulmonary effort is normal. No respiratory distress.      Breath sounds: Rhonchi present.      Comments: Few coarse rhonchi  Musculoskeletal:         General: Normal range of motion.      Cervical back: Normal range of motion and neck supple.   Skin:     General: Skin is warm and dry.   Neurological:      General: No focal deficit present.      Mental Status: He is alert and oriented to person, place, and time.   Psychiatric:         Mood and Affect: Mood  "normal.         Behavior: Behavior normal.         Lab Results   Component Value Date    WBC 5.32 05/19/2022    HGB 15.7 05/19/2022    HCT 45.9 05/19/2022     05/19/2022    CHOL 154 05/19/2022    TRIG 67 05/19/2022    HDL 40 05/19/2022    ALT 24 05/19/2022    AST 24 05/19/2022     05/19/2022    K 4.2 05/19/2022     05/19/2022    CREATININE 0.9 05/19/2022    BUN 11 05/19/2022    CO2 25 05/19/2022    PSA 2.2 02/22/2023    HGBA1C 5.4 05/19/2022     The 10-year ASCVD risk score (Velia DAVIES, et al., 2019) is: 26.3%    Values used to calculate the score:      Age: 78 years      Sex: Male      Is Non- : Yes      Diabetic: No      Tobacco smoker: No      Systolic Blood Pressure: 136 mmHg      Is BP treated: Yes      HDL Cholesterol: 40 mg/dL      Total Cholesterol: 154 mg/dL  Visit Vitals  /70 (BP Location: Right arm, Patient Position: Sitting, BP Method: Large (Manual))   Pulse (!) 48   Resp 18   Ht 5' 9" (1.753 m)   Wt 77.1 kg (170 lb)   SpO2 97%   BMI 25.10 kg/m²      Assessment:       1. Sinusitis, unspecified chronicity, unspecified location    2. Mild chronic obstructive pulmonary disease    3. Bronchitis        Plan:       1. Sinusitis, unspecified chronicity, unspecified location  -     amoxicillin-clavulanate 875-125mg (AUGMENTIN) 875-125 mg per tablet; Take 1 tablet by mouth 2 (two) times daily.  Dispense: 20 tablet; Refill: 0    2. Mild chronic obstructive pulmonary disease  -     predniSONE (DELTASONE) 20 MG tablet; Take 1 tablet (20 mg total) by mouth once daily.  Dispense: 7 tablet; Refill: 0    3. Bronchitis  -     amoxicillin-clavulanate 875-125mg (AUGMENTIN) 875-125 mg per tablet; Take 1 tablet by mouth 2 (two) times daily.  Dispense: 20 tablet; Refill: 0       Follow up in about 6 months (around 7/22/2024), or if symptoms worsen or fail to improve.      Future Appointments       Date Specialty Appt Notes    2/5/2024 Lab psa             "

## 2024-01-22 ENCOUNTER — PATIENT MESSAGE (OUTPATIENT)
Dept: FAMILY MEDICINE | Facility: CLINIC | Age: 79
End: 2024-01-22

## 2024-01-22 ENCOUNTER — HOSPITAL ENCOUNTER (OUTPATIENT)
Dept: RADIOLOGY | Facility: HOSPITAL | Age: 79
Discharge: HOME | End: 2024-01-22
Attending: FAMILY MEDICINE
Payer: MEDICARE

## 2024-01-22 ENCOUNTER — OFFICE VISIT (OUTPATIENT)
Dept: FAMILY MEDICINE | Facility: CLINIC | Age: 79
End: 2024-01-22
Payer: MEDICARE

## 2024-01-22 VITALS
BODY MASS INDEX: 25.18 KG/M2 | HEART RATE: 48 BPM | RESPIRATION RATE: 18 BRPM | HEIGHT: 69 IN | WEIGHT: 170 LBS | DIASTOLIC BLOOD PRESSURE: 70 MMHG | OXYGEN SATURATION: 97 % | SYSTOLIC BLOOD PRESSURE: 136 MMHG

## 2024-01-22 DIAGNOSIS — J40 BRONCHITIS: ICD-10-CM

## 2024-01-22 DIAGNOSIS — J44.9 MILD CHRONIC OBSTRUCTIVE PULMONARY DISEASE: ICD-10-CM

## 2024-01-22 DIAGNOSIS — J32.9 SINUSITIS, UNSPECIFIED CHRONICITY, UNSPECIFIED LOCATION: Primary | ICD-10-CM

## 2024-01-22 PROCEDURE — 1159F MED LIST DOCD IN RCRD: CPT | Mod: CPTII,S$GLB,, | Performed by: FAMILY MEDICINE

## 2024-01-22 PROCEDURE — 99999 PR PBB SHADOW E&M-EST. PATIENT-LVL IV: CPT | Mod: PBBFAC,,, | Performed by: FAMILY MEDICINE

## 2024-01-22 PROCEDURE — 3075F SYST BP GE 130 - 139MM HG: CPT | Mod: CPTII,S$GLB,, | Performed by: FAMILY MEDICINE

## 2024-01-22 PROCEDURE — 1101F PT FALLS ASSESS-DOCD LE1/YR: CPT | Mod: CPTII,S$GLB,, | Performed by: FAMILY MEDICINE

## 2024-01-22 PROCEDURE — 71046 X-RAY EXAM CHEST 2 VIEWS: CPT | Mod: TC

## 2024-01-22 PROCEDURE — 3078F DIAST BP <80 MM HG: CPT | Mod: CPTII,S$GLB,, | Performed by: FAMILY MEDICINE

## 2024-01-22 PROCEDURE — 71046 X-RAY EXAM CHEST 2 VIEWS: CPT | Mod: 26,,, | Performed by: RADIOLOGY

## 2024-01-22 PROCEDURE — 99214 OFFICE O/P EST MOD 30 MIN: CPT | Mod: S$GLB,,, | Performed by: FAMILY MEDICINE

## 2024-01-22 PROCEDURE — 1126F AMNT PAIN NOTED NONE PRSNT: CPT | Mod: CPTII,S$GLB,, | Performed by: FAMILY MEDICINE

## 2024-01-22 PROCEDURE — 3288F FALL RISK ASSESSMENT DOCD: CPT | Mod: CPTII,S$GLB,, | Performed by: FAMILY MEDICINE

## 2024-01-22 RX ORDER — PREDNISONE 20 MG/1
20 TABLET ORAL DAILY
Qty: 7 TABLET | Refills: 0 | Status: SHIPPED | OUTPATIENT
Start: 2024-01-22 | End: 2024-04-16

## 2024-01-22 RX ORDER — AMOXICILLIN AND CLAVULANATE POTASSIUM 875; 125 MG/1; MG/1
1 TABLET, FILM COATED ORAL 2 TIMES DAILY
Qty: 20 TABLET | Refills: 0 | Status: SHIPPED | OUTPATIENT
Start: 2024-01-22 | End: 2024-04-16 | Stop reason: ALTCHOICE

## 2024-01-23 DIAGNOSIS — R91.1 SOLITARY PULMONARY NODULE: Primary | ICD-10-CM

## 2024-01-23 RX ORDER — MINERAL OIL
180 ENEMA (ML) RECTAL DAILY
Qty: 90 TABLET | Refills: 3 | Status: SHIPPED | OUTPATIENT
Start: 2024-01-23

## 2024-01-30 ENCOUNTER — PATIENT MESSAGE (OUTPATIENT)
Dept: FAMILY MEDICINE | Facility: CLINIC | Age: 79
End: 2024-01-30

## 2024-02-09 ENCOUNTER — HOSPITAL ENCOUNTER (OUTPATIENT)
Dept: RADIOLOGY | Facility: HOSPITAL | Age: 79
Discharge: HOME | End: 2024-02-09
Attending: FAMILY MEDICINE
Payer: MEDICARE

## 2024-02-09 DIAGNOSIS — R91.1 SOLITARY PULMONARY NODULE: ICD-10-CM

## 2024-02-09 PROCEDURE — 71250 CT THORAX DX C-: CPT | Mod: 26,,, | Performed by: RADIOLOGY

## 2024-02-09 PROCEDURE — 71250 CT THORAX DX C-: CPT | Mod: TC

## 2024-02-11 ENCOUNTER — PATIENT MESSAGE (OUTPATIENT)
Dept: FAMILY MEDICINE | Facility: CLINIC | Age: 79
End: 2024-02-11
Payer: MEDICARE

## 2024-02-13 DIAGNOSIS — J44.9 MILD CHRONIC OBSTRUCTIVE PULMONARY DISEASE: ICD-10-CM

## 2024-02-13 DIAGNOSIS — R91.1 SOLITARY PULMONARY NODULE: Primary | ICD-10-CM

## 2024-03-05 ENCOUNTER — OFFICE VISIT (OUTPATIENT)
Dept: PULMONOLOGY | Facility: CLINIC | Age: 79
End: 2024-03-05
Payer: MEDICARE

## 2024-03-05 VITALS
OXYGEN SATURATION: 99 % | DIASTOLIC BLOOD PRESSURE: 62 MMHG | BODY MASS INDEX: 25.16 KG/M2 | WEIGHT: 169.88 LBS | SYSTOLIC BLOOD PRESSURE: 138 MMHG | HEART RATE: 45 BPM | HEIGHT: 69 IN

## 2024-03-05 DIAGNOSIS — R91.1 SOLITARY PULMONARY NODULE: ICD-10-CM

## 2024-03-05 DIAGNOSIS — J44.9 MILD CHRONIC OBSTRUCTIVE PULMONARY DISEASE: ICD-10-CM

## 2024-03-05 PROCEDURE — 3078F DIAST BP <80 MM HG: CPT | Mod: CPTII,S$GLB,, | Performed by: STUDENT IN AN ORGANIZED HEALTH CARE EDUCATION/TRAINING PROGRAM

## 2024-03-05 PROCEDURE — 1101F PT FALLS ASSESS-DOCD LE1/YR: CPT | Mod: CPTII,S$GLB,, | Performed by: STUDENT IN AN ORGANIZED HEALTH CARE EDUCATION/TRAINING PROGRAM

## 2024-03-05 PROCEDURE — 1159F MED LIST DOCD IN RCRD: CPT | Mod: CPTII,S$GLB,, | Performed by: STUDENT IN AN ORGANIZED HEALTH CARE EDUCATION/TRAINING PROGRAM

## 2024-03-05 PROCEDURE — 3288F FALL RISK ASSESSMENT DOCD: CPT | Mod: CPTII,S$GLB,, | Performed by: STUDENT IN AN ORGANIZED HEALTH CARE EDUCATION/TRAINING PROGRAM

## 2024-03-05 PROCEDURE — 99999 PR PBB SHADOW E&M-EST. PATIENT-LVL IV: CPT | Mod: PBBFAC,,, | Performed by: STUDENT IN AN ORGANIZED HEALTH CARE EDUCATION/TRAINING PROGRAM

## 2024-03-05 PROCEDURE — 99202 OFFICE O/P NEW SF 15 MIN: CPT | Mod: S$GLB,,, | Performed by: STUDENT IN AN ORGANIZED HEALTH CARE EDUCATION/TRAINING PROGRAM

## 2024-03-05 PROCEDURE — 3075F SYST BP GE 130 - 139MM HG: CPT | Mod: CPTII,S$GLB,, | Performed by: STUDENT IN AN ORGANIZED HEALTH CARE EDUCATION/TRAINING PROGRAM

## 2024-03-05 RX ORDER — DIFLUPREDNATE OPHTHALMIC 0.5 MG/ML
EMULSION OPHTHALMIC
COMMUNITY
End: 2024-06-18

## 2024-03-05 NOTE — PROGRESS NOTES
3/5/2024    Mdai Renee  New Patient Consult    Chief Complaint   Patient presents with    Pulmonary Nodules     New Patient       HPI:Mr Johnson is a 78 year old man who is a former smoking who presents with abnormal imaging. Hx of aortic stenosis    Started smoking at age 14, quit at age of 55, most he smoked was 1 pack per day.     Has been by PCP Dr Martines- due to persistent chronic cough he had some antibitoics and steroids- and it seemed to have helped- his symtpoms improved. During this evaluation he had a CXR which was abnormal and shouwled reticulonodular opacities and repeat CT confirmed there were some nodules particularly in the RUL and some underlying fibrosis. He also had some calcified lymph nodes.     Prev worked as a - at [employer]- did this for 20 years.   Was in the - [employer] then [employer]- went to [place], [facility]- no exposures that he is aware of. Lots of fumes and smoke when working as .     He has history of appendectomy- which he was told was malignant but was excised surgically.     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     He is losing weight- but this was intentional for weight loss  He is not coughing up blood.   He had a colonoscopy which showed some blood- [facility]   He has never had pneumonia in the past. Does not regularly require antibitoics or steroids.   He repotrs breathing is fine - he can walk 100 yards without stopping, goes to the gym- 30 minuets a day - walks on treadmill and bike.     He still has a persistent cough- he reporst he has sinus issues- he coughs the most during the day- intermittently its not constant- he doesn't cough up a a bunch of mucous in the morning. He does not cough after eating. He does not have trouble swallowing. He does have reflux. He does not taking medications.     The chief complaint problem is New to me    PFSH:  Past Medical History:   Diagnosis Date    High  cholesterol     Hypertension     Vertigo          Past Surgical History:   Procedure Laterality Date    COLON SURGERY      pallup removal      SHOULDER SURGERY Right     SPINAL FUSION       Social History     Tobacco Use    Smoking status: Former     Current packs/day: 0.00     Average packs/day: 1 pack/day for 42.3 years (42.3 ttl pk-yrs)     Types: Cigarettes     Start date: 3/15/1964     Quit date: 2006     Years since quittin.6    Smokeless tobacco: Never   Substance Use Topics    Alcohol use: No    Drug use: No     Family History   Problem Relation Age of Onset    Cancer Mother     Hypertension Mother     Vision loss Mother     Hypertension Father     Stroke Father      Review of patient's allergies indicates:   Allergen Reactions    Lisinopril Anaphylaxis     Other reaction(s): Cough  PT REQUEST REMOVAL OF THIS MEDICATION    Grass pollen-jorge grass standard Other (See Comments)       Performance Status:The patient's activity level is regular exercise.      Review of Systems:   Review of Systems   Constitutional:  Negative for chills, fever, malaise/fatigue and weight loss.   HENT:  Negative for congestion, sinus pain and sore throat.    Eyes:  Negative for blurred vision and pain.   Respiratory:  Positive for cough. Negative for shortness of breath and wheezing.    Cardiovascular:  Negative for chest pain, palpitations, orthopnea, claudication and leg swelling.   Gastrointestinal:  Negative for abdominal pain, constipation, diarrhea, heartburn, melena, nausea and vomiting.   Genitourinary:  Negative for dysuria, frequency, hematuria and urgency.   Skin:  Negative for itching and rash.   Neurological:  Negative for dizziness, seizures, loss of consciousness and headaches.   Endo/Heme/Allergies:  Negative for environmental allergies and polydipsia. Does not bruise/bleed easily.   Psychiatric/Behavioral:  Negative for depression. The patient is not nervous/anxious.         Exam:  Physical Exam  Vitals  reviewed.   Constitutional:       General: He is not in acute distress.     Appearance: He is well-developed. He is not diaphoretic.   HENT:      Head: Normocephalic and atraumatic.      Mouth/Throat:      Pharynx: No oropharyngeal exudate or posterior oropharyngeal erythema.   Eyes:      General: No scleral icterus.     Pupils: Pupils are equal, round, and reactive to light.   Neck:      Vascular: No JVD.   Cardiovascular:      Rate and Rhythm: Normal rate and regular rhythm.      Heart sounds: Normal heart sounds. No murmur heard.  Pulmonary:      Effort: Pulmonary effort is normal. No respiratory distress.      Breath sounds: Rales present. No wheezing.   Abdominal:      General: Bowel sounds are normal. There is no distension.      Palpations: Abdomen is soft.      Tenderness: There is no abdominal tenderness.   Musculoskeletal:         General: No swelling.      Cervical back: Normal range of motion and neck supple. No rigidity.   Skin:     General: Skin is warm and dry.      Capillary Refill: Capillary refill takes less than 2 seconds.      Coloration: Skin is not pale.      Findings: No rash.   Neurological:      General: No focal deficit present.      Mental Status: He is alert and oriented to person, place, and time.      Cranial Nerves: No cranial nerve deficit.      Motor: No weakness or abnormal muscle tone.          Radiographs (ct chest and cxr) reviewed: results reviewed               Labs none available   Lab Results   Component Value Date    WBC 5.32 05/19/2022    HGB 15.7 05/19/2022    HCT 45.9 05/19/2022    MCV 92 05/19/2022     05/19/2022       CMP  Sodium   Date Value Ref Range Status   05/19/2022 140 136 - 145 mmol/L Final     Potassium   Date Value Ref Range Status   05/19/2022 4.2 3.5 - 5.1 mmol/L Final     Chloride   Date Value Ref Range Status   05/19/2022 107 95 - 110 mmol/L Final     CO2   Date Value Ref Range Status   05/19/2022 25 23 - 29 mmol/L Final     Glucose   Date Value Ref  Range Status   05/19/2022 98 70 - 110 mg/dL Final     BUN   Date Value Ref Range Status   05/19/2022 11 8 - 23 mg/dL Final     Creatinine   Date Value Ref Range Status   05/19/2022 0.9 0.5 - 1.4 mg/dL Final     Calcium   Date Value Ref Range Status   05/19/2022 9.1 8.7 - 10.5 mg/dL Final     Total Protein   Date Value Ref Range Status   05/19/2022 7.4 6.0 - 8.4 g/dL Final     Albumin   Date Value Ref Range Status   05/19/2022 3.9 3.5 - 5.2 g/dL Final     Total Bilirubin   Date Value Ref Range Status   05/19/2022 1.4 (H) 0.1 - 1.0 mg/dL Final     Comment:     For infants and newborns, interpretation of results should be based  on gestational age, weight and in agreement with clinical  observations.    Premature Infant recommended reference ranges:  Up to 24 hours.............<8.0 mg/dL  Up to 48 hours............<12.0 mg/dL  3-5 days..................<15.0 mg/dL  6-29 days.................<15.0 mg/dL       Alkaline Phosphatase   Date Value Ref Range Status   05/19/2022 79 55 - 135 U/L Final     AST   Date Value Ref Range Status   05/19/2022 24 10 - 40 U/L Final     ALT   Date Value Ref Range Status   05/19/2022 24 10 - 44 U/L Final     Anion Gap   Date Value Ref Range Status   05/19/2022 8 8 - 16 mmol/L Final         PFT was not done.       Plan:  Clinical impression is reasonably certain and repeated evaluation prn +/- follow up will be needed as below.    Mr Johnson is a 78 yaer old man who presents with abnormal imaging. He repotrs persistent cough after a recent issue with respiratory exacerbation that was thought to be related to a bronchitis- he improved with antibiotics and steroids but still has persistent cough- I think this is due to reflux and possible some post nasal drip.     Regarding the abnormal CT- I think really difficult to discern if this is scar tissue in the RUL or represents nodules. He has some thickening in the pleura in the RLL- looks abnormal. He worked as a  for many years and was  smoking- I think he is high risk for having primary lung cancer.     That being said- tough to say if abnormalities are infectious, scar tissue, or malignancy.     Agree with PET/CT     Will plan on PET/CT in 1 month- then follow up with me.   If the abnormalities are persistent or worsening we will plan on EBUS/bronch  If the abnormalities are improving- then we may be able to attribute to scaring/infectious etiology.     He needs PFTs.     Follow up with me in 1 month after PET/CT     Problem List Items Addressed This Visit          Pulmonary    Mild chronic obstructive pulmonary disease     Other Visit Diagnoses       Solitary pulmonary nodule                Follow up in about 4 weeks (around 4/2/2024).    Discussed with patient above for education the following:      Patient Instructions   Follow up with me in 1 month after your PET scan.   Lease get your PFTs once they are scheduled.

## 2024-03-19 ENCOUNTER — HOSPITAL ENCOUNTER (OUTPATIENT)
Dept: RADIOLOGY | Facility: HOSPITAL | Age: 79
Discharge: HOME | End: 2024-03-19
Attending: STUDENT IN AN ORGANIZED HEALTH CARE EDUCATION/TRAINING PROGRAM
Payer: MEDICARE

## 2024-03-19 VITALS — BODY MASS INDEX: 24.29 KG/M2 | HEIGHT: 69 IN | WEIGHT: 164 LBS

## 2024-03-19 DIAGNOSIS — R91.1 SOLITARY PULMONARY NODULE: ICD-10-CM

## 2024-03-19 LAB — GLUCOSE SERPL-MCNC: 84 MG/DL (ref 70–110)

## 2024-03-19 PROCEDURE — A9552 F18 FDG: HCPCS | Mod: PO | Performed by: STUDENT IN AN ORGANIZED HEALTH CARE EDUCATION/TRAINING PROGRAM

## 2024-03-19 PROCEDURE — 78815 PET IMAGE W/CT SKULL-THIGH: CPT | Mod: TC,PO

## 2024-03-19 PROCEDURE — 82962 GLUCOSE BLOOD TEST: CPT | Mod: PO

## 2024-03-19 RX ORDER — FLUDEOXYGLUCOSE F18 500 MCI/ML
12.9 INJECTION INTRAVENOUS
Status: COMPLETED | OUTPATIENT
Start: 2024-03-19 | End: 2024-03-19

## 2024-03-19 RX ADMIN — FLUDEOXYGLUCOSE F-18 12.9 MILLICURIE: 500 INJECTION INTRAVENOUS at 09:03

## 2024-03-25 ENCOUNTER — PATIENT MESSAGE (OUTPATIENT)
Dept: FAMILY MEDICINE | Facility: CLINIC | Age: 79
End: 2024-03-25
Payer: MEDICARE

## 2024-04-15 ENCOUNTER — HOSPITAL ENCOUNTER (EMERGENCY)
Facility: HOSPITAL | Age: 79
Discharge: HOME | End: 2024-04-15
Attending: EMERGENCY MEDICINE
Payer: MEDICARE

## 2024-04-15 VITALS
HEIGHT: 68 IN | SYSTOLIC BLOOD PRESSURE: 156 MMHG | RESPIRATION RATE: 14 BRPM | HEART RATE: 65 BPM | TEMPERATURE: 98 F | OXYGEN SATURATION: 95 % | DIASTOLIC BLOOD PRESSURE: 66 MMHG | BODY MASS INDEX: 24.86 KG/M2 | WEIGHT: 164 LBS

## 2024-04-15 DIAGNOSIS — M54.2 ACUTE NECK PAIN: ICD-10-CM

## 2024-04-15 DIAGNOSIS — M48.02 NEURAL FORAMINAL STENOSIS OF CERVICAL SPINE: Primary | ICD-10-CM

## 2024-04-15 LAB
HCV AB SERPL QL IA: NORMAL
HIV 1+2 AB+HIV1 P24 AG SERPL QL IA: NORMAL

## 2024-04-15 PROCEDURE — 25000003 PHARM REV CODE 250: Performed by: EMERGENCY MEDICINE

## 2024-04-15 PROCEDURE — 72050 X-RAY EXAM NECK SPINE 4/5VWS: CPT | Mod: TC

## 2024-04-15 PROCEDURE — 99284 EMERGENCY DEPT VISIT MOD MDM: CPT | Mod: 25

## 2024-04-15 PROCEDURE — 87389 HIV-1 AG W/HIV-1&-2 AB AG IA: CPT | Performed by: EMERGENCY MEDICINE

## 2024-04-15 PROCEDURE — 72050 X-RAY EXAM NECK SPINE 4/5VWS: CPT | Mod: 26,,, | Performed by: RADIOLOGY

## 2024-04-15 PROCEDURE — 86803 HEPATITIS C AB TEST: CPT | Performed by: EMERGENCY MEDICINE

## 2024-04-15 RX ORDER — MELOXICAM 15 MG/1
15 TABLET ORAL DAILY PRN
Qty: 14 TABLET | Refills: 0 | Status: SHIPPED | OUTPATIENT
Start: 2024-04-15 | End: 2024-04-15

## 2024-04-15 RX ORDER — LIDOCAINE 50 MG/G
1 PATCH TOPICAL
Status: DISCONTINUED | OUTPATIENT
Start: 2024-04-15 | End: 2024-04-15 | Stop reason: HOSPADM

## 2024-04-15 RX ORDER — GABAPENTIN 100 MG/1
100 CAPSULE ORAL
Status: COMPLETED | OUTPATIENT
Start: 2024-04-15 | End: 2024-04-15

## 2024-04-15 RX ORDER — GABAPENTIN 100 MG/1
200 CAPSULE ORAL 3 TIMES DAILY
Qty: 180 CAPSULE | Refills: 0 | Status: SHIPPED | OUTPATIENT
Start: 2024-04-15 | End: 2024-04-15

## 2024-04-15 RX ORDER — MELOXICAM 15 MG/1
15 TABLET ORAL DAILY PRN
Qty: 14 TABLET | Refills: 0 | Status: SHIPPED | OUTPATIENT
Start: 2024-04-15 | End: 2024-04-29

## 2024-04-15 RX ORDER — KETOROLAC TROMETHAMINE 10 MG/1
10 TABLET, FILM COATED ORAL
Status: COMPLETED | OUTPATIENT
Start: 2024-04-15 | End: 2024-04-15

## 2024-04-15 RX ORDER — GABAPENTIN 100 MG/1
200 CAPSULE ORAL 3 TIMES DAILY
Qty: 180 CAPSULE | Refills: 0 | Status: SHIPPED | OUTPATIENT
Start: 2024-04-15 | End: 2024-04-16 | Stop reason: SDUPTHER

## 2024-04-15 RX ADMIN — GABAPENTIN 100 MG: 100 CAPSULE ORAL at 08:04

## 2024-04-15 RX ADMIN — KETOROLAC TROMETHAMINE 10 MG: 10 TABLET, FILM COATED ORAL at 07:04

## 2024-04-15 RX ADMIN — LIDOCAINE 1 PATCH: 50 PATCH CUTANEOUS at 08:04

## 2024-04-15 NOTE — ED PROVIDER NOTES
History     Chief Complaint   Patient presents with    Neck Pain     Posterior neck pain that radiates into right shoulder x a few days. Denies injury/trauma. Worse with movement per patient.      HPI:  Madi Renee is a 78 y.o. male with PMH as below who presents to the [facility] emergency department for evaluation of right sided neck pain radiating from midline posterior neck to right trapezius, and intermittently down right side of chest or back; aching and shock-like; triggered by leaning head back or turning head to the right. No recent trauma. He has no other complaints.       External medical record reviewed: Note from pulm clinic shows evaluation for RUL lung scarring/imaging abnormality with workup in process including upcoming PET/CT     PCP: Inna Martines MD    Review of patient's allergies indicates:   Allergen Reactions    Lisinopril Anaphylaxis     Other reaction(s): Cough  PT REQUEST REMOVAL OF THIS MEDICATION    Grass pollen- grass standard Other (See Comments)      Past Medical History:   Diagnosis Date    High cholesterol     Hypertension     Vertigo      Past Surgical History:   Procedure Laterality Date    COLON SURGERY      pallup removal      SHOULDER SURGERY Right     SPINAL FUSION         Family History   Problem Relation Name Age of Onset    Cancer Mother Jazz Renee     Hypertension Mother Jazz Renee     Vision loss Mother aJzz Renee     Hypertension Father Crow Renee     Stroke Father Crow Renee      Social History     Tobacco Use    Smoking status: Former     Current packs/day: 0.00     Average packs/day: 1 pack/day for 42.3 years (42.3 ttl pk-yrs)     Types: Cigarettes     Start date: 3/15/1964     Quit date: 2006     Years since quittin.7    Smokeless tobacco: Never   Substance and Sexual Activity    Alcohol use: No    Drug use: No    Sexual activity: Not Currently     Partners: Female      Review of Systems     Review of Systems  "  Constitutional: Negative.  Negative for fever.   HENT: Negative.     Eyes: Negative.    Respiratory:  Positive for cough (subacute, being worked up by pulm with follow-up CTs).    Cardiovascular: Negative.    Gastrointestinal: Negative.    Endocrine: Negative.    Genitourinary: Negative.    Musculoskeletal: Negative.    Skin: Negative.    Allergic/Immunologic: Negative.    Neurological: Negative.    Hematological: Negative.    Psychiatric/Behavioral: Negative.     All other systems reviewed and are negative.       Physical Exam     Initial Vitals [04/15/24 0700]   BP Pulse Resp Temp SpO2   (!) 156/66 65 14 98.4 °F (36.9 °C) 95 %      MAP       --          Nursing notes and vital signs reviewed.  Constitutional: Patient is in mild to moderate acute distress.   Head: Normocephalic. Atraumatic.   Eyes:  Conjunctivae are not pale. No scleral icterus.   ENT: Mucous membranes moist.   Neck: Pain with extension and right rotation. Right sided paracervical tenderness. No midline tenderness.   Cardiovascular: Regular rate. Regular rhythm.   Pulmonary: No respiratory distress.   Abdominal: Non-distended.   Musculoskeletal: Moves all extremities. No obvious deformities. Right trapezius tenderness which reproduces the pain of his chief complaint   Skin: Warm and dry.   Neurological:  Alert, awake, and appropriate. Normal speech. No acute lateralizing neurologic deficits appreciated.   Psychiatric: Normal affect.       ED Course   Procedures  Vitals:    04/15/24 0700   BP: (!) 156/66   Pulse: 65   Resp: 14   Temp: 98.4 °F (36.9 °C)   TempSrc: Oral   SpO2: 95%   Weight: 74.4 kg (164 lb)   Height: 5' 8" (1.727 m)     Lab Results Interpreted as Abnormal:  Labs Reviewed   HIV 1 / 2 ANTIBODY   HEPATITIS C ANTIBODY      All Lab Results:  Results for orders placed or performed during the hospital encounter of 03/19/24   POCT glucose   Result Value Ref Range    POC Glucose 84 70 - 110     Imaging Results              X-Ray Cervical " Spine Complete 5 view (Final result)  Result time 04/15/24 08:32:28      Final result by Jimenez Rogers MD (04/15/24 08:32:28)                   Impression:      1. Grade 1 anterolisthesis of C4 on C5.  2. Grade 1 retrolisthesis of C6 on C7.  3. Mild to moderate multilevel degenerative disc disease resulting in mild to moderate bilateral neural foraminal narrowing.      Electronically signed by: Jimenez Rogers  Date:    04/15/2024  Time:    08:32               Narrative:    EXAMINATION:  XR CERVICAL SPINE COMPLETE 5 VIEW    CLINICAL HISTORY:  . Cervicalgia    TECHNIQUE:  AP, Lateral, bilateral oblique and open mouth views of the cervical spine were performed.    COMPARISON:  None    FINDINGS:  Trace grade 1 anterolisthesis of C4 on C5.  Trace grade 1 retrolisthesis of C6 on C7.  Partial interbody fusion of C5-C6.  There is mild to moderate multilevel degenerative disc disease most pronounced from C5 through C7 with bridging anterior osteophytosis.  No significant prevertebral soft tissue swelling.    Bilateral oblique views demonstrate mild to moderate neural foraminal narrowing.                                     ED Physician's independent review of the above imaging: agree with radiologist    The emergency physician reviewed the vital signs / test results outlined above.     ED Discussion      Patient's evaluation in the ED does not suggest any emergent or life-threatening medical conditions requiring immediate intervention beyond what was provided in the ED, and I believe patient is safe for discharge. Regardless, an unremarkable evaluation in the ED does not preclude the development or presence of a serious or life-threatening condition. As such, patient was given return instructions for any change or worsening of symptoms.       ED Medication(s) Administered:  Medications   LIDOcaine 5 % patch 1 patch (1 patch Transdermal Patch Applied 4/15/24 4423)   ketorolac tablet 10 mg (10 mg Oral Given 4/15/24 1552)    gabapentin capsule 100 mg (100 mg Oral Given 4/15/24 0831)       Prescription Management: I performed a review of the patient's current Rx medication list as input by nursing staff.    Patient's Medications   New Prescriptions    GABAPENTIN (NEURONTIN) 100 MG CAPSULE    Take 2 capsules (200 mg total) by mouth 3 (three) times daily.    MELOXICAM (MOBIC) 15 MG TABLET    Take 1 tablet (15 mg total) by mouth daily as needed for Pain.   Previous Medications    AMOXICILLIN-CLAVULANATE 875-125MG (AUGMENTIN) 875-125 MG PER TABLET    Take 1 tablet by mouth 2 (two) times daily.    BRIMONIDINE 0.2% (ALPHAGAN) 0.2 % DROP    Place into both eyes.    CAMPHOR-PHENOL (CAMPHO-PHENIQUE) 10.8-4.7 % SOLN    Apply topically daily as needed.    DIFLUPREDNATE (DUREZOL) 0.05 % DROP OPHTHALMIC SOLUTION    INSTILL 1 DROP INTO LEFT EYE 4 TIMES DAILY    DOCOSANOL 10 % CREA    Apply topically.    DORZOLAMIDE-TIMOLOL 2-0.5% (COSOPT) 22.3-6.8 MG/ML OPHTHALMIC SOLUTION    Place into both eyes.    FEXOFENADINE (ALLEGRA) 180 MG TABLET    Take 1 tablet (180 mg total) by mouth once daily.    FINASTERIDE (PROSCAR) 5 MG TABLET    Take 1 tablet (5 mg total) by mouth once daily.    FLUTICASONE PROPIONATE (FLONASE) 50 MCG/ACTUATION NASAL SPRAY    2 sprays (100 mcg total) by Each Nostril route once daily.    PRAVASTATIN (PRAVACHOL) 40 MG TABLET    Take 1 tablet (40 mg total) by mouth once daily.    PREDNISOLONE ACETATE (PRED FORTE) 1 % DRPS    Place into both eyes.    PREDNISONE (DELTASONE) 20 MG TABLET    Take 1 tablet (20 mg total) by mouth once daily.    TAMSULOSIN (FLOMAX) 0.4 MG CAP    Take 2 capsules (0.8 mg total) by mouth once daily.    VALACYCLOVIR (VALTREX) 1000 MG TABLET    Take 1 tablet (1,000 mg total) by mouth 2 (two) times daily.    VALSARTAN (DIOVAN) 80 MG TABLET    Take 1 tablet (80 mg total) by mouth once daily.   Modified Medications    No medications on file   Discontinued Medications    No medications on file         Follow-up  Information       Schedule an appointment as soon as possible for a visit  with Inna Martines MD.    Specialty: Family Medicine  Contact information:  [address] [facility] [place] [address]  [phone]               [facility] Emergency Dept.    Specialty: Emergency Medicine  Why: As needed, If symptoms worsen  Contact information:  [address] [facility] [address] [phone]                          Clinical Impression       ICD-10-CM ICD-9-CM   1. Neural foraminal stenosis of cervical spine  M48.02 723.0   2. Acute neck pain  M54.2 723.1      ED Disposition Condition    Discharge Stable             Florentino Odom MD  04/15/24 0858

## 2024-04-16 ENCOUNTER — LAB VISIT (OUTPATIENT)
Dept: LAB | Facility: HOSPITAL | Age: 79
End: 2024-04-16
Attending: FAMILY MEDICINE
Payer: MEDICARE

## 2024-04-16 ENCOUNTER — OFFICE VISIT (OUTPATIENT)
Dept: FAMILY MEDICINE | Facility: CLINIC | Age: 79
End: 2024-04-16
Payer: MEDICARE

## 2024-04-16 VITALS
HEIGHT: 68 IN | BODY MASS INDEX: 25.43 KG/M2 | OXYGEN SATURATION: 98 % | HEART RATE: 60 BPM | WEIGHT: 167.81 LBS | DIASTOLIC BLOOD PRESSURE: 56 MMHG | RESPIRATION RATE: 14 BRPM | SYSTOLIC BLOOD PRESSURE: 134 MMHG

## 2024-04-16 DIAGNOSIS — G56.83: ICD-10-CM

## 2024-04-16 DIAGNOSIS — R79.9 ABNORMAL FINDING OF BLOOD CHEMISTRY, UNSPECIFIED: ICD-10-CM

## 2024-04-16 DIAGNOSIS — Z00.00 ANNUAL PHYSICAL EXAM: ICD-10-CM

## 2024-04-16 DIAGNOSIS — Z12.5 ENCOUNTER FOR SCREENING FOR MALIGNANT NEOPLASM OF PROSTATE: ICD-10-CM

## 2024-04-16 DIAGNOSIS — E78.5 HYPERLIPIDEMIA, UNSPECIFIED HYPERLIPIDEMIA TYPE: ICD-10-CM

## 2024-04-16 DIAGNOSIS — R91.1 SOLITARY PULMONARY NODULE: ICD-10-CM

## 2024-04-16 DIAGNOSIS — R17 ELEVATED BILIRUBIN: Primary | ICD-10-CM

## 2024-04-16 DIAGNOSIS — C60.9 MALIGNANT NEOPLASM OF PENIS, UNSPECIFIED: Primary | ICD-10-CM

## 2024-04-16 DIAGNOSIS — I73.9 PERIPHERAL VASCULAR DISEASE, UNSPECIFIED: ICD-10-CM

## 2024-04-16 DIAGNOSIS — I10 ESSENTIAL HYPERTENSION: ICD-10-CM

## 2024-04-16 DIAGNOSIS — Z79.899 OTHER LONG TERM (CURRENT) DRUG THERAPY: ICD-10-CM

## 2024-04-16 DIAGNOSIS — M54.2 ACUTE NECK PAIN: ICD-10-CM

## 2024-04-16 DIAGNOSIS — M48.02 NEURAL FORAMINAL STENOSIS OF CERVICAL SPINE: ICD-10-CM

## 2024-04-16 LAB
ALBUMIN SERPL BCP-MCNC: 3.7 G/DL (ref 3.5–5.2)
ALP SERPL-CCNC: 85 U/L (ref 55–135)
ALT SERPL W/O P-5'-P-CCNC: 14 U/L (ref 10–44)
ANION GAP SERPL CALC-SCNC: 10 MMOL/L (ref 8–16)
AST SERPL-CCNC: 18 U/L (ref 10–40)
BASOPHILS # BLD AUTO: 0.04 K/UL (ref 0–0.2)
BASOPHILS NFR BLD: 0.6 % (ref 0–1.9)
BILIRUB SERPL-MCNC: 2.3 MG/DL (ref 0.1–1)
BUN SERPL-MCNC: 10 MG/DL (ref 8–23)
CALCIUM SERPL-MCNC: 8.9 MG/DL (ref 8.7–10.5)
CHLORIDE SERPL-SCNC: 103 MMOL/L (ref 95–110)
CHOLEST SERPL-MCNC: 142 MG/DL (ref 120–199)
CHOLEST/HDLC SERPL: 3.4 {RATIO} (ref 2–5)
CO2 SERPL-SCNC: 25 MMOL/L (ref 23–29)
COMPLEXED PSA SERPL-MCNC: 2 NG/ML (ref 0–4)
CREAT SERPL-MCNC: 0.8 MG/DL (ref 0.5–1.4)
DIFFERENTIAL METHOD BLD: ABNORMAL
EOSINOPHIL # BLD AUTO: 0.3 K/UL (ref 0–0.5)
EOSINOPHIL NFR BLD: 3.9 % (ref 0–8)
ERYTHROCYTE [DISTWIDTH] IN BLOOD BY AUTOMATED COUNT: 12 % (ref 11.5–14.5)
EST. GFR  (NO RACE VARIABLE): >60 ML/MIN/1.73 M^2
ESTIMATED AVG GLUCOSE: 103 MG/DL (ref 68–131)
GLUCOSE SERPL-MCNC: 83 MG/DL (ref 70–110)
HBA1C MFR BLD: 5.2 % (ref 4–5.6)
HCT VFR BLD AUTO: 46.8 % (ref 40–54)
HDLC SERPL-MCNC: 42 MG/DL (ref 40–75)
HDLC SERPL: 29.6 % (ref 20–50)
HGB BLD-MCNC: 15.3 G/DL (ref 14–18)
IMM GRANULOCYTES # BLD AUTO: 0.01 K/UL (ref 0–0.04)
IMM GRANULOCYTES NFR BLD AUTO: 0.1 % (ref 0–0.5)
LDLC SERPL CALC-MCNC: 84.6 MG/DL (ref 63–159)
LYMPHOCYTES # BLD AUTO: 1.3 K/UL (ref 1–4.8)
LYMPHOCYTES NFR BLD: 18.7 % (ref 18–48)
MCH RBC QN AUTO: 31.6 PG (ref 27–31)
MCHC RBC AUTO-ENTMCNC: 32.7 G/DL (ref 32–36)
MCV RBC AUTO: 97 FL (ref 82–98)
MONOCYTES # BLD AUTO: 0.9 K/UL (ref 0.3–1)
MONOCYTES NFR BLD: 12.5 % (ref 4–15)
NEUTROPHILS # BLD AUTO: 4.4 K/UL (ref 1.8–7.7)
NEUTROPHILS NFR BLD: 64.2 % (ref 38–73)
NONHDLC SERPL-MCNC: 100 MG/DL
NRBC BLD-RTO: 0 /100 WBC
PLATELET # BLD AUTO: 188 K/UL (ref 150–450)
PMV BLD AUTO: 9.5 FL (ref 9.2–12.9)
POTASSIUM SERPL-SCNC: 3.9 MMOL/L (ref 3.5–5.1)
PROT SERPL-MCNC: 7.9 G/DL (ref 6–8.4)
RBC # BLD AUTO: 4.84 M/UL (ref 4.6–6.2)
SODIUM SERPL-SCNC: 138 MMOL/L (ref 136–145)
TRIGL SERPL-MCNC: 77 MG/DL (ref 30–150)
TSH SERPL DL<=0.005 MIU/L-ACNC: 1.48 UIU/ML (ref 0.4–4)
WBC # BLD AUTO: 6.89 K/UL (ref 3.9–12.7)

## 2024-04-16 PROCEDURE — 84443 ASSAY THYROID STIM HORMONE: CPT | Performed by: FAMILY MEDICINE

## 2024-04-16 PROCEDURE — 84153 ASSAY OF PSA TOTAL: CPT | Performed by: FAMILY MEDICINE

## 2024-04-16 PROCEDURE — 1101F PT FALLS ASSESS-DOCD LE1/YR: CPT | Mod: CPTII,S$GLB,, | Performed by: FAMILY MEDICINE

## 2024-04-16 PROCEDURE — 36415 COLL VENOUS BLD VENIPUNCTURE: CPT | Performed by: FAMILY MEDICINE

## 2024-04-16 PROCEDURE — 99214 OFFICE O/P EST MOD 30 MIN: CPT | Mod: S$GLB,,, | Performed by: FAMILY MEDICINE

## 2024-04-16 PROCEDURE — 99999 PR PBB SHADOW E&M-EST. PATIENT-LVL V: CPT | Mod: PBBFAC,,, | Performed by: FAMILY MEDICINE

## 2024-04-16 PROCEDURE — 1159F MED LIST DOCD IN RCRD: CPT | Mod: CPTII,S$GLB,, | Performed by: FAMILY MEDICINE

## 2024-04-16 PROCEDURE — 3288F FALL RISK ASSESSMENT DOCD: CPT | Mod: CPTII,S$GLB,, | Performed by: FAMILY MEDICINE

## 2024-04-16 PROCEDURE — 1125F AMNT PAIN NOTED PAIN PRSNT: CPT | Mod: CPTII,S$GLB,, | Performed by: FAMILY MEDICINE

## 2024-04-16 PROCEDURE — 85025 COMPLETE CBC W/AUTO DIFF WBC: CPT | Performed by: FAMILY MEDICINE

## 2024-04-16 PROCEDURE — 82607 VITAMIN B-12: CPT | Performed by: FAMILY MEDICINE

## 2024-04-16 PROCEDURE — 80053 COMPREHEN METABOLIC PANEL: CPT | Performed by: FAMILY MEDICINE

## 2024-04-16 PROCEDURE — 83036 HEMOGLOBIN GLYCOSYLATED A1C: CPT | Performed by: FAMILY MEDICINE

## 2024-04-16 PROCEDURE — 80061 LIPID PANEL: CPT | Performed by: FAMILY MEDICINE

## 2024-04-16 PROCEDURE — 82306 VITAMIN D 25 HYDROXY: CPT | Performed by: FAMILY MEDICINE

## 2024-04-16 PROCEDURE — 3075F SYST BP GE 130 - 139MM HG: CPT | Mod: CPTII,S$GLB,, | Performed by: FAMILY MEDICINE

## 2024-04-16 PROCEDURE — 3078F DIAST BP <80 MM HG: CPT | Mod: CPTII,S$GLB,, | Performed by: FAMILY MEDICINE

## 2024-04-16 RX ORDER — GABAPENTIN 100 MG/1
200 CAPSULE ORAL 3 TIMES DAILY
Qty: 180 CAPSULE | Refills: 3 | Status: SHIPPED | OUTPATIENT
Start: 2024-04-16 | End: 2024-05-23 | Stop reason: ALTCHOICE

## 2024-04-16 RX ORDER — LIDOCAINE 50 MG/G
1 PATCH TOPICAL DAILY
Qty: 90 PATCH | Refills: 3 | Status: SHIPPED | OUTPATIENT
Start: 2024-04-16

## 2024-04-16 NOTE — PROGRESS NOTES
Subjective:       Patient ID: Madi Renee is a 78 y.o. male.    Chief Complaint: Follow-up (04/15/24 Right neck and shoulder pain)      Past Medical History:   Diagnosis Date    High cholesterol     Hypertension     Vertigo        Past Surgical History:   Procedure Laterality Date    COLON SURGERY      pallup removal      SHOULDER SURGERY Right     SPINAL FUSION          Social History     Socioeconomic History    Marital status:    Tobacco Use    Smoking status: Former     Current packs/day: 0.00     Average packs/day: 1 pack/day for 42.3 years (42.3 ttl pk-yrs)     Types: Cigarettes     Start date: 3/15/1964     Quit date: 2006     Years since quittin.7    Smokeless tobacco: Never   Substance and Sexual Activity    Alcohol use: No    Drug use: No    Sexual activity: Not Currently     Partners: Female     Social Determinants of Health     Financial Resource Strain: Low Risk  (2024)    Overall Financial Resource Strain (CARDIA)     Difficulty of Paying Living Expenses: Not hard at all   Food Insecurity: No Food Insecurity (2024)    Hunger Vital Sign     Worried About Running Out of Food in the Last Year: Never true     Ran Out of Food in the Last Year: Never true   Transportation Needs: No Transportation Needs (2024)    PRAPARE - Transportation     Lack of Transportation (Medical): No     Lack of Transportation (Non-Medical): No   Physical Activity: Sufficiently Active (2024)    Exercise Vital Sign     Days of Exercise per Week: 3 days     Minutes of Exercise per Session: 60 min   Stress: No Stress Concern Present (2024)    [employer] - Occupational Stress Questionnaire     Feeling of Stress : Not at all   Social Connections: Unknown (2024)    Social Connection and Isolation Panel [NHANES]     Frequency of Communication with Friends and Family: Once a week     Frequency of Social Gatherings with Friends and Family: Once a week     Active  Member of Clubs or Organizations: Yes     Attends Club or Organization Meetings: 1 to 4 times per year     Marital Status:    Housing Stability: Unknown (4/16/2024)    Housing Stability Vital Sign     Unable to Pay for Housing in the Last Year: No       Family History   Problem Relation Name Age of Onset    Cancer Mother Louriane Dedeaux     Hypertension Mother Michellene Dedeaux     Vision loss Mother Michellene Dedeaux     Hypertension Father Crow Dedeaux     Stroke Father Crow Dedeaux        Review of patient's allergies indicates:   Allergen Reactions    Lisinopril Anaphylaxis     Other reaction(s): Cough  PT REQUEST REMOVAL OF THIS MEDICATION    Grass pollen-jorge grass standard Other (See Comments)          Current Outpatient Medications:     brimonidine 0.2% (ALPHAGAN) 0.2 % Drop, Place into both eyes., Disp: , Rfl:     difluprednate (DUREZOL) 0.05 % Drop ophthalmic solution, INSTILL 1 DROP INTO LEFT EYE 4 TIMES DAILY, Disp: , Rfl:     dorzolamide-timolol 2-0.5% (COSOPT) 22.3-6.8 mg/mL ophthalmic solution, Place into both eyes., Disp: , Rfl:     fexofenadine (ALLEGRA) 180 MG tablet, Take 1 tablet (180 mg total) by mouth once daily., Disp: 90 tablet, Rfl: 3    finasteride (PROSCAR) 5 mg tablet, Take 1 tablet (5 mg total) by mouth once daily., Disp: 90 tablet, Rfl: 3    fluticasone propionate (FLONASE) 50 mcg/actuation nasal spray, 2 sprays (100 mcg total) by Each Nostril route once daily., Disp: 9.9 mL, Rfl: 11    meloxicam (MOBIC) 15 MG tablet, Take 1 tablet (15 mg total) by mouth daily as needed for Pain., Disp: 14 tablet, Rfl: 0    pravastatin (PRAVACHOL) 40 MG tablet, Take 1 tablet (40 mg total) by mouth once daily. (Patient taking differently: Take 20 mg by mouth once daily.), Disp: 90 tablet, Rfl: 3    prednisoLONE acetate (PRED FORTE) 1 % DrpS, Place into both eyes., Disp: , Rfl:     tamsulosin (FLOMAX) 0.4 mg Cap, Take 2 capsules (0.8 mg total) by mouth once daily., Disp: 180 capsule, Rfl: 3     valsartan (DIOVAN) 80 MG tablet, Take 1 tablet (80 mg total) by mouth once daily., Disp: 90 tablet, Rfl: 3    gabapentin (NEURONTIN) 100 MG capsule, Take 2 capsules (200 mg total) by mouth 3 (three) times daily., Disp: 180 capsule, Rfl: 3    LIDOcaine (LIDODERM) 5 %, Place 1 patch onto the skin once daily. Remove & Discard patch within 12 hours or as directed by MD, Disp: 90 patch, Rfl: 3    Mr Renee is a 77 yo male with cervical spinal stenosis. He was seen in ER on 04/15/2024 with severe back pain in the cervical region. He had an xray that showed spinal stenosis. He was given toradol, gabapentin and a lidoderm patch. He had very good relief of his pain. He was prescribed meloxicam and gabapentin. He states it is helping, but he is in pain still. We discussed doing an MRI and he is ok with doing that, we can refer pain management    Follow-up  Associated symptoms include arthralgias, myalgias and neck pain. Pertinent negatives include no abdominal pain, chest pain, chills, congestion, coughing, diaphoresis, fever, nausea, rash or sore throat.     Review of Systems   Constitutional:  Negative for activity change, appetite change, chills, diaphoresis and fever.   HENT:  Negative for congestion, ear pain, postnasal drip, rhinorrhea and sore throat.    Eyes:  Negative for pain, discharge, redness and itching.   Respiratory:  Negative for cough and shortness of breath.    Cardiovascular:  Negative for chest pain, palpitations and leg swelling.   Gastrointestinal:  Negative for abdominal distention, abdominal pain, constipation, diarrhea and nausea.   Genitourinary:  Negative for difficulty urinating, dysuria, frequency and urgency.   Musculoskeletal:  Positive for arthralgias, back pain, myalgias, neck pain and neck stiffness.   Skin:  Negative for color change, rash and wound.   All other systems reviewed and are negative.      Objective:      Physical Exam  Vitals and nursing note reviewed.   Constitutional:        Appearance: Normal appearance. He is normal weight.   HENT:      Head: Normocephalic.      Nose: Nose normal.   Eyes:      Extraocular Movements: Extraocular movements intact.      Conjunctiva/sclera: Conjunctivae normal.      Pupils: Pupils are equal, round, and reactive to light.   Cardiovascular:      Rate and Rhythm: Normal rate and regular rhythm.      Heart sounds: Normal heart sounds. No murmur heard.  Pulmonary:      Effort: Pulmonary effort is normal. No respiratory distress.      Breath sounds: Normal breath sounds.   Musculoskeletal:         General: Normal range of motion.      Cervical back: Normal range of motion and neck supple.   Skin:     General: Skin is warm and dry.   Neurological:      General: No focal deficit present.      Mental Status: He is alert and oriented to person, place, and time.   Psychiatric:         Mood and Affect: Mood normal.         Behavior: Behavior normal.         Assessment:       1. Malignant neoplasm of penis, unspecified    2. Peripheral vascular disease, unspecified    3. Acute neck pain    4. Neural foraminal stenosis of cervical spine    5. Solitary pulmonary nodule    6. Hyperlipidemia, unspecified hyperlipidemia type    7. Essential hypertension    8. Annual physical exam    9. Other long term (current) drug therapy    10. Abnormal finding of blood chemistry, unspecified    11. Other specified mononeuropathies of bilateral upper limbs    12. Encounter for screening for malignant neoplasm of prostate        Plan:         Malignant neoplasm of penis, unspecified- stable    Peripheral vascular disease, unspecified- stable    Acute neck pain  -     Cancel: Ambulatory referral/consult to Pain Clinic; Future; Expected date: 04/23/2024  -     Ambulatory referral/consult to Pain Clinic; Future; Expected date: 04/23/2024    Neural foraminal stenosis of cervical spine  -     gabapentin (NEURONTIN) 100 MG capsule; Take 2 capsules (200 mg total) by mouth 3 (three) times daily.   Dispense: 180 capsule; Refill: 3  -     Cancel: MRI Cervical Spine Without Contrast; Future; Expected date: 04/16/2024  -     Cancel: Ambulatory referral/consult to Pain Clinic; Future; Expected date: 04/23/2024  -     MRI Cervical Spine Without Contrast; Future; Expected date: 04/16/2024  -     Ambulatory referral/consult to Pain Clinic; Future; Expected date: 04/23/2024    Solitary pulmonary nodule    Hyperlipidemia, unspecified hyperlipidemia type  -     Lipid Panel; Future; Expected date: 04/16/2024    Essential hypertension  -     CBC Auto Differential; Future; Expected date: 04/16/2024  -     Comprehensive Metabolic Panel; Future; Expected date: 04/16/2024    Annual physical exam  -     LIDOcaine (LIDODERM) 5 %; Place 1 patch onto the skin once daily. Remove & Discard patch within 12 hours or as directed by MD  Dispense: 90 patch; Refill: 3  -     gabapentin (NEURONTIN) 100 MG capsule; Take 2 capsules (200 mg total) by mouth 3 (three) times daily.  Dispense: 180 capsule; Refill: 3  -     Vitamin D; Future; Expected date: 04/16/2024  -     Vitamin B12; Future; Expected date: 04/16/2024  -     Lipid Panel; Future; Expected date: 04/16/2024  -     CBC Auto Differential; Future; Expected date: 04/16/2024  -     Comprehensive Metabolic Panel; Future; Expected date: 04/16/2024  -     Hemoglobin A1C; Future; Expected date: 04/16/2024  -     TSH; Future; Expected date: 04/16/2024  -     PSA, Screening; Future; Expected date: 04/16/2024    Other long term (current) drug therapy  -     Vitamin D; Future; Expected date: 04/16/2024  -     Vitamin B12; Future; Expected date: 04/16/2024    Abnormal finding of blood chemistry, unspecified  -     Hemoglobin A1C; Future; Expected date: 04/16/2024    Other specified mononeuropathies of bilateral upper limbs  -     TSH; Future; Expected date: 04/16/2024  -     Cancel: MRI Cervical Spine Without Contrast; Future; Expected date: 04/16/2024  -     Cancel: Ambulatory  referral/consult to Pain Clinic; Future; Expected date: 04/23/2024  -     MRI Cervical Spine Without Contrast; Future; Expected date: 04/16/2024  -     Ambulatory referral/consult to Pain Clinic; Future; Expected date: 04/23/2024    Encounter for screening for malignant neoplasm of prostate  -     PSA, Screening; Future; Expected date: 04/16/2024        Risks, benefits, and side effects were discussed with the patient. All questions were answered to the fullest satisfaction of the patient, and pt verbalized understanding and agreement to treatment plan. Pt was to call with any new or worsening symptoms, or present to the ER.        Inna Martines MD

## 2024-04-17 DIAGNOSIS — E55.9 VITAMIN D DEFICIENCY: Primary | ICD-10-CM

## 2024-04-17 LAB
25(OH)D3+25(OH)D2 SERPL-MCNC: 30 NG/ML (ref 30–96)
VIT B12 SERPL-MCNC: 314 PG/ML (ref 210–950)

## 2024-04-17 RX ORDER — ERGOCALCIFEROL 1.25 MG/1
50000 CAPSULE ORAL
Qty: 13 CAPSULE | Refills: 3 | Status: SHIPPED | OUTPATIENT
Start: 2024-04-17

## 2024-04-25 ENCOUNTER — HOSPITAL ENCOUNTER (OUTPATIENT)
Dept: RADIOLOGY | Facility: HOSPITAL | Age: 79
Discharge: HOME | End: 2024-04-25
Attending: FAMILY MEDICINE
Payer: MEDICARE

## 2024-04-25 DIAGNOSIS — R17 ELEVATED BILIRUBIN: ICD-10-CM

## 2024-04-25 PROCEDURE — 76705 ECHO EXAM OF ABDOMEN: CPT | Mod: TC

## 2024-04-25 PROCEDURE — 76705 ECHO EXAM OF ABDOMEN: CPT | Mod: 26,,, | Performed by: RADIOLOGY

## 2024-04-26 ENCOUNTER — TELEPHONE (OUTPATIENT)
Dept: FAMILY MEDICINE | Facility: CLINIC | Age: 79
End: 2024-04-26
Payer: MEDICARE

## 2024-04-26 DIAGNOSIS — Z00.00 ANNUAL PHYSICAL EXAM: ICD-10-CM

## 2024-04-26 RX ORDER — LIDOCAINE 50 MG/G
1 PATCH TOPICAL DAILY
Qty: 90 PATCH | Refills: 3 | OUTPATIENT
Start: 2024-04-26

## 2024-04-26 NOTE — TELEPHONE ENCOUNTER
Refill Routing Note   Medication(s) are not appropriate for processing by [facility] for the following reason(s):        Outside of protocol    [facility] action(s):  Route               Appointments  past 12m or future 3m with PCP    Date Provider   Last Visit   4/16/2024 Inna Martines MD   Next Visit   7/24/2024 Inna Martines MD   ED visits in past 90 days: 1        Note composed:4:56 PM 04/26/2024

## 2024-04-26 NOTE — TELEPHONE ENCOUNTER
No care due was identified.  [facility] Embedded Care Due Messages. Reference number: [id number].   4/26/2024 4:47:53 PM CDT

## 2024-05-03 ENCOUNTER — PATIENT MESSAGE (OUTPATIENT)
Dept: FAMILY MEDICINE | Facility: CLINIC | Age: 79
End: 2024-05-03
Payer: MEDICARE

## 2024-05-03 DIAGNOSIS — E78.5 HYPERLIPIDEMIA, UNSPECIFIED HYPERLIPIDEMIA TYPE: ICD-10-CM

## 2024-05-03 DIAGNOSIS — I10 ESSENTIAL HYPERTENSION: ICD-10-CM

## 2024-05-03 RX ORDER — VALSARTAN 80 MG/1
80 TABLET ORAL DAILY
Qty: 90 TABLET | Refills: 0 | Status: SHIPPED | OUTPATIENT
Start: 2024-05-03 | End: 2024-06-18 | Stop reason: SDUPTHER

## 2024-05-03 RX ORDER — PRAVASTATIN SODIUM 40 MG/1
40 TABLET ORAL DAILY
Qty: 90 TABLET | Refills: 0 | Status: SHIPPED | OUTPATIENT
Start: 2024-05-03 | End: 2024-06-18 | Stop reason: SDUPTHER

## 2024-05-03 NOTE — TELEPHONE ENCOUNTER
Refill Routing Note   Medication(s) are not appropriate for processing by [facility] for the following reason(s):        No active prescription written by provider    [facility] action(s):  Defer        Medication Therapy Plan: Last ordered: 10 months ago (7/5/2023) by Grover Correa MD      Appointments  past 12m or future 3m with PCP    Date Provider   Last Visit   4/16/2024 Inna Martines MD   Next Visit   7/24/2024 Inna Martines MD   ED visits in past 90 days: 1        Note composed:4:03 PM 05/03/2024

## 2024-05-03 NOTE — TELEPHONE ENCOUNTER
No care due was identified.  Health [facility] Embedded Care Due Messages. Reference number: [id number].   5/03/2024 12:46:12 PM CDT

## 2024-05-08 ENCOUNTER — HOSPITAL ENCOUNTER (OUTPATIENT)
Dept: RADIOLOGY | Facility: HOSPITAL | Age: 79
Discharge: HOME | End: 2024-05-08
Attending: FAMILY MEDICINE
Payer: MEDICARE

## 2024-05-08 ENCOUNTER — TELEPHONE (OUTPATIENT)
Dept: FAMILY MEDICINE | Facility: CLINIC | Age: 79
End: 2024-05-08
Payer: MEDICARE

## 2024-05-08 DIAGNOSIS — M48.02 NEURAL FORAMINAL STENOSIS OF CERVICAL SPINE: ICD-10-CM

## 2024-05-08 DIAGNOSIS — G56.83: ICD-10-CM

## 2024-05-08 PROCEDURE — 72141 MRI NECK SPINE W/O DYE: CPT | Mod: TC

## 2024-05-08 PROCEDURE — 72141 MRI NECK SPINE W/O DYE: CPT | Mod: 26,,, | Performed by: RADIOLOGY

## 2024-05-08 NOTE — TELEPHONE ENCOUNTER
Contacted patient about MRI results. Patient wants a follow up with Dr Martines to discuss results before making any decisions regarding pain management or neurosurgery.

## 2024-05-09 ENCOUNTER — TELEPHONE (OUTPATIENT)
Dept: FAMILY MEDICINE | Facility: CLINIC | Age: 79
End: 2024-05-09
Payer: MEDICARE

## 2024-05-09 NOTE — TELEPHONE ENCOUNTER
Attempted to call patient to confirm MRI follow up appt for today at 2 pm. No voicemail able to be left.

## 2024-05-23 ENCOUNTER — OFFICE VISIT (OUTPATIENT)
Dept: PULMONOLOGY | Facility: CLINIC | Age: 79
End: 2024-05-23
Payer: MEDICARE

## 2024-05-23 VITALS
HEIGHT: 68 IN | BODY MASS INDEX: 25.07 KG/M2 | DIASTOLIC BLOOD PRESSURE: 59 MMHG | OXYGEN SATURATION: 95 % | WEIGHT: 165.44 LBS | HEART RATE: 50 BPM | SYSTOLIC BLOOD PRESSURE: 125 MMHG

## 2024-05-23 DIAGNOSIS — J44.9 MILD CHRONIC OBSTRUCTIVE PULMONARY DISEASE: ICD-10-CM

## 2024-05-23 DIAGNOSIS — R91.1 SOLITARY PULMONARY NODULE: Primary | ICD-10-CM

## 2024-05-23 PROCEDURE — 99213 OFFICE O/P EST LOW 20 MIN: CPT | Mod: S$GLB,,, | Performed by: STUDENT IN AN ORGANIZED HEALTH CARE EDUCATION/TRAINING PROGRAM

## 2024-05-23 PROCEDURE — 3288F FALL RISK ASSESSMENT DOCD: CPT | Mod: CPTII,S$GLB,, | Performed by: STUDENT IN AN ORGANIZED HEALTH CARE EDUCATION/TRAINING PROGRAM

## 2024-05-23 PROCEDURE — 1159F MED LIST DOCD IN RCRD: CPT | Mod: CPTII,S$GLB,, | Performed by: STUDENT IN AN ORGANIZED HEALTH CARE EDUCATION/TRAINING PROGRAM

## 2024-05-23 PROCEDURE — 3078F DIAST BP <80 MM HG: CPT | Mod: CPTII,S$GLB,, | Performed by: STUDENT IN AN ORGANIZED HEALTH CARE EDUCATION/TRAINING PROGRAM

## 2024-05-23 PROCEDURE — 99999 PR PBB SHADOW E&M-EST. PATIENT-LVL III: CPT | Mod: PBBFAC,,, | Performed by: STUDENT IN AN ORGANIZED HEALTH CARE EDUCATION/TRAINING PROGRAM

## 2024-05-23 PROCEDURE — 1101F PT FALLS ASSESS-DOCD LE1/YR: CPT | Mod: CPTII,S$GLB,, | Performed by: STUDENT IN AN ORGANIZED HEALTH CARE EDUCATION/TRAINING PROGRAM

## 2024-05-23 PROCEDURE — 3074F SYST BP LT 130 MM HG: CPT | Mod: CPTII,S$GLB,, | Performed by: STUDENT IN AN ORGANIZED HEALTH CARE EDUCATION/TRAINING PROGRAM

## 2024-05-23 PROCEDURE — 1126F AMNT PAIN NOTED NONE PRSNT: CPT | Mod: CPTII,S$GLB,, | Performed by: STUDENT IN AN ORGANIZED HEALTH CARE EDUCATION/TRAINING PROGRAM

## 2024-05-23 RX ORDER — MONTELUKAST SODIUM 10 MG/1
10 TABLET ORAL NIGHTLY
Qty: 30 TABLET | Refills: 0 | Status: SHIPPED | OUTPATIENT
Start: 2024-05-23 | End: 2024-06-18 | Stop reason: SDUPTHER

## 2024-05-23 NOTE — PROGRESS NOTES
5/23/2024    Madi Renee  Patient Follow up     Chief Complaint   Patient presents with    Pulmonary Nodules       HPI:Mr Johnson is a 78 year old man who is a former smoking who presents with abnormal imaging. Hx of aortic stenosis    Started smoking at age 14, quit at age of 55, most he smoked was 1 pack per day.     Has been by PCP Dr Martines- due to persistent chronic cough he had some antibitoics and steroids- and it seemed to have helped- his symtpoms improved. During this evaluation he had a CXR which was abnormal and shouwled reticulonodular opacities and repeat CT confirmed there were some nodules particularly in the RUL and some underlying fibrosis. He also had some calcified lymph nodes.     Prev worked as a - at [employer]- did this for 20 years.   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 Lots of fumes and smoke when working as .     He has history of appendectomy- which he was told was malignant but was excised surgically.     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     He is losing weight- but this was intentional for weight loss  He is not coughing up blood.   He had a colonoscopy which showed some blood- [facility]   He has never had pneumonia in the past. Does not regularly require antibitoics or steroids.   He repotrs breathing is fine - he can walk 100 yards without stopping, goes to the gym- 30 minuets a day - walks on treadmill and bike.     He still has a persistent cough- he reporst he has sinus issues- he coughs the most during the day- intermittently its not constant- he doesn't cough up a a bunch of mucous in the morning. He does not cough after eating. He does not have trouble swallowing. He does have reflux. He does not taking medications.     Today:  Doing well  Repeat PET scan was reassuring  He repotsr persistent cough and dripping in the back of his  throat       The chief complaint problem is Stable    PFSH:  Past Medical History:   Diagnosis Date    High cholesterol     Hypertension     Vertigo          Past Surgical History:   Procedure Laterality Date    COLON SURGERY      pallup removal      SHOULDER SURGERY Right     SPINAL FUSION       Social History     Tobacco Use    Smoking status: Former     Current packs/day: 0.00     Average packs/day: 1 pack/day for 42.3 years (42.3 ttl pk-yrs)     Types: Cigarettes     Start date: 3/15/1964     Quit date: 2006     Years since quittin.8    Smokeless tobacco: Never   Substance Use Topics    Alcohol use: No    Drug use: No     Family History   Problem Relation Name Age of Onset    Cancer Mother Jazz Dedeaux     Hypertension Mother Jazz Renee     Vision loss Mother Jazz Oconnoreajing     Hypertension Father Crow Renee     Stroke Father Crow Renee      Review of patient's allergies indicates:   Allergen Reactions    Lisinopril Anaphylaxis     Other reaction(s): Cough  PT REQUEST REMOVAL OF THIS MEDICATION    Grass pollen- grass standard Other (See Comments)       Performance Status:The patient's activity level is regular exercise.      Review of Systems:   Review of Systems   Constitutional:  Negative for chills, fever, malaise/fatigue and weight loss.   HENT:  Negative for congestion, sinus pain and sore throat.    Eyes:  Negative for blurred vision and pain.   Respiratory:  Positive for cough. Negative for shortness of breath and wheezing.    Cardiovascular:  Negative for chest pain, palpitations, orthopnea, claudication and leg swelling.   Gastrointestinal:  Negative for abdominal pain, constipation, diarrhea, heartburn, melena, nausea and vomiting.   Genitourinary:  Negative for dysuria, frequency, hematuria and urgency.   Skin:  Negative for itching and rash.   Neurological:  Negative for dizziness, seizures, loss of consciousness and headaches.   Endo/Heme/Allergies:  Negative for  environmental allergies and polydipsia. Does not bruise/bleed easily.   Psychiatric/Behavioral:  Negative for depression. The patient is not nervous/anxious.         Exam:  Physical Exam  Vitals reviewed.   Constitutional:       General: He is not in acute distress.     Appearance: He is well-developed. He is not diaphoretic.   HENT:      Head: Normocephalic and atraumatic.      Mouth/Throat:      Pharynx: No oropharyngeal exudate or posterior oropharyngeal erythema.   Eyes:      General: No scleral icterus.     Pupils: Pupils are equal, round, and reactive to light.   Neck:      Vascular: No JVD.   Cardiovascular:      Rate and Rhythm: Normal rate and regular rhythm.      Heart sounds: Normal heart sounds. No murmur heard.  Pulmonary:      Effort: Pulmonary effort is normal. No respiratory distress.      Breath sounds: Rales present. No wheezing.   Abdominal:      General: Bowel sounds are normal. There is no distension.      Palpations: Abdomen is soft.      Tenderness: There is no abdominal tenderness.   Musculoskeletal:         General: No swelling.      Cervical back: Normal range of motion and neck supple. No rigidity.   Skin:     General: Skin is warm and dry.      Capillary Refill: Capillary refill takes less than 2 seconds.      Coloration: Skin is not pale.      Findings: No rash.   Neurological:      General: No focal deficit present.      Mental Status: He is alert and oriented to person, place, and time.      Cranial Nerves: No cranial nerve deficit.      Motor: No weakness or abnormal muscle tone.          Radiographs (ct chest and cxr) reviewed: results reviewed               Labs none available   Lab Results   Component Value Date    WBC 6.89 04/16/2024    HGB 15.3 04/16/2024    HCT 46.8 04/16/2024    MCV 97 04/16/2024     04/16/2024       CMP  Sodium   Date Value Ref Range Status   04/16/2024 138 136 - 145 mmol/L Final     Potassium   Date Value Ref Range Status   04/16/2024 3.9 3.5 - 5.1  mmol/L Final     Chloride   Date Value Ref Range Status   04/16/2024 103 95 - 110 mmol/L Final     CO2   Date Value Ref Range Status   04/16/2024 25 23 - 29 mmol/L Final     Glucose   Date Value Ref Range Status   04/16/2024 83 70 - 110 mg/dL Final     BUN   Date Value Ref Range Status   04/16/2024 10 8 - 23 mg/dL Final     Creatinine   Date Value Ref Range Status   04/16/2024 0.8 0.5 - 1.4 mg/dL Final     Calcium   Date Value Ref Range Status   04/16/2024 8.9 8.7 - 10.5 mg/dL Final     Total Protein   Date Value Ref Range Status   04/16/2024 7.9 6.0 - 8.4 g/dL Final     Albumin   Date Value Ref Range Status   04/16/2024 3.7 3.5 - 5.2 g/dL Final     Total Bilirubin   Date Value Ref Range Status   04/16/2024 2.3 (H) 0.1 - 1.0 mg/dL Final     Comment:     For infants and newborns, interpretation of results should be based  on gestational age, weight and in agreement with clinical  observations.    Premature Infant recommended reference ranges:  Up to 24 hours.............<8.0 mg/dL  Up to 48 hours............<12.0 mg/dL  3-5 days..................<15.0 mg/dL  6-29 days.................<15.0 mg/dL       Alkaline Phosphatase   Date Value Ref Range Status   04/16/2024 85 55 - 135 U/L Final     AST   Date Value Ref Range Status   04/16/2024 18 10 - 40 U/L Final     ALT   Date Value Ref Range Status   04/16/2024 14 10 - 44 U/L Final     Anion Gap   Date Value Ref Range Status   04/16/2024 10 8 - 16 mmol/L Final     eGFR   Date Value Ref Range Status   04/16/2024 >60.0 >60 mL/min/1.73 m^2 Final         PFT was not done.       Plan:  Clinical impression is reasonably certain and repeated evaluation prn +/- follow up will be needed as below.    Mr Johnson is a 78 yaer old man who presents with abnormal imaging. He repotrs persistent cough after a recent issue with respiratory exacerbation that was thought to be related to a bronchitis- he improved with antibiotics and steroids but still has persistent cough- I think this is  due to reflux and possible some post nasal drip.     Regarding the abnormal CT- I think RUL nodular opacities represent scarring- PET scan shows no avidity. Will need to repeat another CT scan in about 6 months- if still stable then we can either continue yearly monitoring.     We will trial Singulair, in addition we discussed adding neti pot in the morning followed by flonase and continue antihistamine.     We will get PFTs    Follow up with me in 3 months- after PFTs       Problem List Items Addressed This Visit    None        No follow-ups on file.    Discussed with patient above for education the following:      There are no Patient Instructions on file for this visit.

## 2024-05-31 ENCOUNTER — HOSPITAL ENCOUNTER (OUTPATIENT)
Dept: PULMONOLOGY | Facility: HOSPITAL | Age: 79
Discharge: HOME | End: 2024-05-31
Attending: STUDENT IN AN ORGANIZED HEALTH CARE EDUCATION/TRAINING PROGRAM
Payer: MEDICARE

## 2024-05-31 DIAGNOSIS — R91.1 SOLITARY PULMONARY NODULE: ICD-10-CM

## 2024-05-31 LAB
DLCO SINGLE BREATH LLN: 16.99
DLCO SINGLE BREATH PRE REF: 62.2 %
DLCO SINGLE BREATH REF: 23.92
DLCOC SBVA LLN: 2.36
DLCOC SBVA REF: 3.56
DLCOC SINGLE BREATH LLN: 16.99
DLCOC SINGLE BREATH REF: 23.92
DLCOVA LLN: 2.36
DLCOVA PRE REF: 117.3 %
DLCOVA PRE: 4.18 ML/(MIN*MMHG*L) (ref 2.36–4.76)
DLCOVA REF: 3.56
ERV LLN: -16449.09
ERV PRE REF: 104 %
ERV REF: 0.91
FEF 25 75 CHG: 28.1 %
FEF 25 75 LLN: 0.76
FEF 25 75 POST REF: 75.6 %
FEF 25 75 PRE REF: 59 %
FEF 25 75 REF: 1.97
FET100 CHG: -6.7 %
FEV1 CHG: 6.3 %
FEV1 FVC CHG: 4.8 %
FEV1 FVC LLN: 60
FEV1 FVC POST REF: 100.2 %
FEV1 FVC PRE REF: 95.5 %
FEV1 FVC REF: 75
FEV1 LLN: 1.9
FEV1 POST REF: 69.6 %
FEV1 PRE REF: 65.5 %
FEV1 REF: 2.73
FRCPLETH LLN: 2.67
FRCPLETH PREREF: 73.6 %
FRCPLETH REF: 3.65
FVC CHG: 1.4 %
FVC LLN: 2.65
FVC POST REF: 69 %
FVC PRE REF: 68 %
FVC REF: 3.66
IVC PRE: 2.42 L (ref 2.65–4.69)
IVC SINGLE BREATH LLN: 2.65
IVC SINGLE BREATH PRE REF: 66 %
IVC SINGLE BREATH REF: 3.66
MVV LLN: 91
MVV PRE REF: 59.4 %
MVV REF: 107
PEF CHG: -24.5 %
PEF LLN: 4.85
PEF POST REF: 67.6 %
PEF PRE REF: 89.5 %
PEF REF: 7.04
POST FEF 25 75: 1.49 L/S (ref 0.76–3.75)
POST FET 100: 7.34 SEC
POST FEV1 FVC: 75.25 % (ref 60.45–88.38)
POST FEV1: 1.9 L (ref 1.9–3.5)
POST FVC: 2.53 L (ref 2.65–4.69)
POST PEF: 4.76 L/S (ref 4.85–9.22)
PRE DLCO: 14.89 ML/(MIN*MMHG) (ref 16.99–30.85)
PRE ERV: 0.94 L (ref -16449.09–16450.91)
PRE FEF 25 75: 1.16 L/S (ref 0.76–3.75)
PRE FET 100: 7.86 SEC
PRE FEV1 FVC: 71.79 % (ref 60.45–88.38)
PRE FEV1: 1.79 L (ref 1.9–3.5)
PRE FRC PL: 2.69 L (ref 2.67–4.64)
PRE FVC: 2.49 L (ref 2.65–4.69)
PRE MVV: 63.54 L/MIN (ref 90.95–123.05)
PRE PEF: 6.3 L/S (ref 4.85–9.22)
PRE RV: 1.75 L (ref 2.07–3.42)
PRE TLC: 4.24 L (ref 5.57–7.87)
RAW LLN: 3.06
RAW PRE REF: 142.3 %
RAW PRE: 4.35 CMH2O*S/L (ref 3.06–3.06)
RAW REF: 3.06
RV LLN: 2.07
RV PRE REF: 63.6 %
RV REF: 2.75
RVTLC LLN: 35
RVTLC PRE REF: 92.9 %
RVTLC PRE: 41.22 % (ref 35.4–53.36)
RVTLC REF: 44
TLC LLN: 5.57
TLC PRE REF: 63.1 %
TLC REF: 6.72
VA PRE: 3.57 L (ref 6.57–6.57)
VA SINGLE BREATH LLN: 6.57
VA SINGLE BREATH PRE REF: 54.3 %
VA SINGLE BREATH REF: 6.57
VC LLN: 2.65
VC PRE REF: 68 %
VC PRE: 2.49 L (ref 2.65–4.69)
VC REF: 3.66

## 2024-05-31 PROCEDURE — 94726 PLETHYSMOGRAPHY LUNG VOLUMES: CPT | Mod: 26,,, | Performed by: INTERNAL MEDICINE

## 2024-05-31 PROCEDURE — 94060 EVALUATION OF WHEEZING: CPT | Mod: 26,,, | Performed by: INTERNAL MEDICINE

## 2024-05-31 PROCEDURE — 94729 DIFFUSING CAPACITY: CPT | Mod: 26,,, | Performed by: INTERNAL MEDICINE

## 2024-05-31 PROCEDURE — 94729 DIFFUSING CAPACITY: CPT

## 2024-05-31 PROCEDURE — 94060 EVALUATION OF WHEEZING: CPT

## 2024-05-31 PROCEDURE — 94726 PLETHYSMOGRAPHY LUNG VOLUMES: CPT

## 2024-05-31 RX ORDER — ALBUTEROL SULFATE 2.5 MG/.5ML
SOLUTION RESPIRATORY (INHALATION)
Status: DISPENSED
Start: 2024-05-31 | End: 2024-05-31

## 2024-06-03 ENCOUNTER — PATIENT MESSAGE (OUTPATIENT)
Dept: FAMILY MEDICINE | Facility: CLINIC | Age: 79
End: 2024-06-03
Payer: MEDICARE

## 2024-06-18 ENCOUNTER — OFFICE VISIT (OUTPATIENT)
Dept: FAMILY MEDICINE | Facility: CLINIC | Age: 79
End: 2024-06-18
Payer: MEDICARE

## 2024-06-18 VITALS
OXYGEN SATURATION: 98 % | WEIGHT: 167 LBS | RESPIRATION RATE: 14 BRPM | BODY MASS INDEX: 25.31 KG/M2 | HEIGHT: 68 IN | DIASTOLIC BLOOD PRESSURE: 56 MMHG | SYSTOLIC BLOOD PRESSURE: 124 MMHG | HEART RATE: 52 BPM

## 2024-06-18 DIAGNOSIS — J30.1 SEASONAL ALLERGIC RHINITIS DUE TO POLLEN: Primary | ICD-10-CM

## 2024-06-18 DIAGNOSIS — I10 ESSENTIAL HYPERTENSION: ICD-10-CM

## 2024-06-18 DIAGNOSIS — E78.5 HYPERLIPIDEMIA, UNSPECIFIED HYPERLIPIDEMIA TYPE: ICD-10-CM

## 2024-06-18 PROCEDURE — 3288F FALL RISK ASSESSMENT DOCD: CPT | Mod: CPTII,S$GLB,, | Performed by: FAMILY MEDICINE

## 2024-06-18 PROCEDURE — 1101F PT FALLS ASSESS-DOCD LE1/YR: CPT | Mod: CPTII,S$GLB,, | Performed by: FAMILY MEDICINE

## 2024-06-18 PROCEDURE — 99999 PR PBB SHADOW E&M-EST. PATIENT-LVL III: CPT | Mod: PBBFAC,,, | Performed by: FAMILY MEDICINE

## 2024-06-18 PROCEDURE — 3078F DIAST BP <80 MM HG: CPT | Mod: CPTII,S$GLB,, | Performed by: FAMILY MEDICINE

## 2024-06-18 PROCEDURE — 99214 OFFICE O/P EST MOD 30 MIN: CPT | Mod: S$GLB,,, | Performed by: FAMILY MEDICINE

## 2024-06-18 PROCEDURE — 1126F AMNT PAIN NOTED NONE PRSNT: CPT | Mod: CPTII,S$GLB,, | Performed by: FAMILY MEDICINE

## 2024-06-18 PROCEDURE — 3074F SYST BP LT 130 MM HG: CPT | Mod: CPTII,S$GLB,, | Performed by: FAMILY MEDICINE

## 2024-06-18 PROCEDURE — 1159F MED LIST DOCD IN RCRD: CPT | Mod: CPTII,S$GLB,, | Performed by: FAMILY MEDICINE

## 2024-06-18 RX ORDER — PRAVASTATIN SODIUM 40 MG/1
40 TABLET ORAL DAILY
Qty: 90 TABLET | Refills: 3 | Status: SHIPPED | OUTPATIENT
Start: 2024-06-18

## 2024-06-18 RX ORDER — VALSARTAN 80 MG/1
80 TABLET ORAL DAILY
Qty: 90 TABLET | Refills: 3 | Status: SHIPPED | OUTPATIENT
Start: 2024-06-18

## 2024-06-18 RX ORDER — FLUTICASONE PROPIONATE 50 MCG
2 SPRAY, SUSPENSION (ML) NASAL DAILY
Qty: 16 ML | Refills: 3 | Status: SHIPPED | OUTPATIENT
Start: 2024-06-18

## 2024-06-18 RX ORDER — MONTELUKAST SODIUM 10 MG/1
10 TABLET ORAL NIGHTLY
Qty: 90 TABLET | Refills: 3 | Status: SHIPPED | OUTPATIENT
Start: 2024-06-18 | End: 2024-09-16

## 2024-06-18 NOTE — PROGRESS NOTES
Subjective:       Patient ID: Madi Renee is a 78 y.o. male.    Chief Complaint: Follow-up (Mri results)      Past Medical History:   Diagnosis Date    High cholesterol     Hypertension     Vertigo        Past Surgical History:   Procedure Laterality Date    COLON SURGERY      COLONOSCOPY  2024    done at [facility]    pallup removal      SHOULDER SURGERY Right     SPINAL FUSION          Social History     Socioeconomic History    Marital status:    Tobacco Use    Smoking status: Former     Current packs/day: 0.00     Average packs/day: 1 pack/day for 42.3 years (42.3 ttl pk-yrs)     Types: Cigarettes     Start date: 3/15/1964     Quit date: 2006     Years since quittin.9    Smokeless tobacco: Never   Substance and Sexual Activity    Alcohol use: No    Drug use: No    Sexual activity: Not Currently     Partners: Female     Social Determinants of Health     Financial Resource Strain: Low Risk  (2024)    Overall Financial Resource Strain (CARDIA)     Difficulty of Paying Living Expenses: Not hard at all   Food Insecurity: No Food Insecurity (2024)    Hunger Vital Sign     Worried About Running Out of Food in the Last Year: Never true     Ran Out of Food in the Last Year: Never true   Transportation Needs: No Transportation Needs (2024)    PRAPARE - Transportation     Lack of Transportation (Medical): No     Lack of Transportation (Non-Medical): No   Physical Activity: Sufficiently Active (2024)    Exercise Vital Sign     Days of Exercise per Week: 3 days     Minutes of Exercise per Session: 60 min   Stress: No Stress Concern Present (2024)    [employer] [place] [employer] - Occupational Stress Questionnaire     Feeling of Stress : Not at all   Housing Stability: Unknown (2024)    Housing Stability Vital Sign     Unable to Pay for Housing in the Last Year: No       Family History   Problem Relation Name Age of Onset    Cancer Mother Jazz Renee      Hypertension Mother Jazz Renee     Vision loss Mother Jazz Renee     Hypertension Father Crow Renee     Stroke Father Crow Renee        Review of patient's allergies indicates:   Allergen Reactions    Lisinopril Anaphylaxis     Other reaction(s): Cough  PT REQUEST REMOVAL OF THIS MEDICATION    Grass pollen-june grass standard Other (See Comments)          Current Outpatient Medications:     brimonidine 0.2% (ALPHAGAN) 0.2 % Drop, Place into both eyes., Disp: , Rfl:     dorzolamide-timolol 2-0.5% (COSOPT) 22.3-6.8 mg/mL ophthalmic solution, Place into both eyes., Disp: , Rfl:     ergocalciferol (ERGOCALCIFEROL) 50,000 unit Cap, Take 1 capsule (50,000 Units total) by mouth every 7 days., Disp: 13 capsule, Rfl: 3    fexofenadine (ALLEGRA) 180 MG tablet, Take 1 tablet (180 mg total) by mouth once daily., Disp: 90 tablet, Rfl: 3    finasteride (PROSCAR) 5 mg tablet, Take 1 tablet (5 mg total) by mouth once daily., Disp: 90 tablet, Rfl: 3    LIDOcaine (LIDODERM) 5 %, Place 1 patch onto the skin once daily. Remove & Discard patch within 12 hours or as directed by MD, Disp: 90 patch, Rfl: 3    prednisoLONE acetate (PRED FORTE) 1 % DrpS, Place into both eyes., Disp: , Rfl:     tamsulosin (FLOMAX) 0.4 mg Cap, Take 2 capsules (0.8 mg total) by mouth once daily., Disp: 180 capsule, Rfl: 3    fluticasone propionate (FLONASE) 50 mcg/actuation nasal spray, 2 sprays (100 mcg total) by Each Nostril route once daily., Disp: 16 mL, Rfl: 3    montelukast (SINGULAIR) 10 mg tablet, Take 1 tablet (10 mg total) by mouth every evening., Disp: 90 tablet, Rfl: 3    pravastatin (PRAVACHOL) 40 MG tablet, Take 1 tablet (40 mg total) by mouth once daily., Disp: 90 tablet, Rfl: 3    valsartan (DIOVAN) 80 MG tablet, Take 1 tablet (80 mg total) by mouth once daily., Disp: 90 tablet, Rfl: 3    Mr Renee is a 77 yo male here to go over his last MRI of the cervical spine. We discussed the study in detail. Mr Renee is not hurting  yet, and he works out at the gym 3x a week. He states he has no pain in his upper back or neck.  He does not really want to do anything, not even pain management unless he is having pain.  He also asked me about the results to his pulmonary function tests.  He had them done a little over 2 weeks ago.  He was not followed up with his pulmonologist yet and I recommended that he follow up with his pulmonologist for those results.  The results are in his chart, but the interpretation is not.  He is up-to-date on labs, but he does need some refills    Follow-up  Pertinent negatives include no abdominal pain, chest pain, chills, congestion, coughing, diaphoresis, fever, nausea, rash or sore throat.     Review of Systems   Constitutional:  Negative for activity change, appetite change, chills, diaphoresis and fever.   HENT:  Negative for congestion, ear pain, postnasal drip, rhinorrhea and sore throat.    Eyes:  Negative for pain, discharge, redness and itching.   Respiratory:  Negative for cough and shortness of breath.    Cardiovascular:  Negative for chest pain, palpitations and leg swelling.   Gastrointestinal:  Negative for abdominal distention, abdominal pain, constipation, diarrhea and nausea.   Genitourinary:  Negative for difficulty urinating, dysuria, frequency and urgency.   Skin:  Negative for color change, rash and wound.   All other systems reviewed and are negative.      Objective:      Physical Exam  Vitals and nursing note reviewed.   Constitutional:       Appearance: Normal appearance. He is normal weight.   HENT:      Head: Normocephalic.      Nose: Nose normal.   Eyes:      Extraocular Movements: Extraocular movements intact.      Conjunctiva/sclera: Conjunctivae normal.      Pupils: Pupils are equal, round, and reactive to light.   Cardiovascular:      Heart sounds: No murmur heard.  Pulmonary:      Effort: Pulmonary effort is normal. No respiratory distress.   Musculoskeletal:         General: Normal  range of motion.      Cervical back: Normal range of motion and neck supple.   Skin:     General: Skin is warm and dry.   Neurological:      General: No focal deficit present.      Mental Status: He is alert and oriented to person, place, and time.   Psychiatric:         Mood and Affect: Mood normal.         Behavior: Behavior normal.         Assessment:       1. Seasonal allergic rhinitis due to pollen    2. Essential hypertension    3. Hyperlipidemia, unspecified hyperlipidemia type        Plan:         Seasonal allergic rhinitis due to pollen  -     montelukast (SINGULAIR) 10 mg tablet; Take 1 tablet (10 mg total) by mouth every evening.  Dispense: 90 tablet; Refill: 3  -     fluticasone propionate (FLONASE) 50 mcg/actuation nasal spray; 2 sprays (100 mcg total) by Each Nostril route once daily.  Dispense: 16 mL; Refill: 3    Essential hypertension  -     valsartan (DIOVAN) 80 MG tablet; Take 1 tablet (80 mg total) by mouth once daily.  Dispense: 90 tablet; Refill: 3    Hyperlipidemia, unspecified hyperlipidemia type  -     pravastatin (PRAVACHOL) 40 MG tablet; Take 1 tablet (40 mg total) by mouth once daily.  Dispense: 90 tablet; Refill: 3        Risks, benefits, and side effects were discussed with the patient. All questions were answered to the fullest satisfaction of the patient, and pt verbalized understanding and agreement to treatment plan. Pt was to call with any new or worsening symptoms, or present to the ER.        Inna Martines MD

## 2024-06-26 PROBLEM — Z85.49 HISTORY OF MALIGNANT NEOPLASM OF PENIS: Status: ACTIVE | Noted: 2024-04-16

## 2024-07-08 ENCOUNTER — PATIENT MESSAGE (OUTPATIENT)
Dept: FAMILY MEDICINE | Facility: CLINIC | Age: 79
End: 2024-07-08
Payer: MEDICARE

## 2024-07-16 ENCOUNTER — PATIENT MESSAGE (OUTPATIENT)
Dept: FAMILY MEDICINE | Facility: CLINIC | Age: 79
End: 2024-07-16
Payer: MEDICARE

## 2024-07-16 DIAGNOSIS — M48.02 NEURAL FORAMINAL STENOSIS OF CERVICAL SPINE: Primary | ICD-10-CM

## 2024-07-16 NOTE — TELEPHONE ENCOUNTER
Per MRI results pt has agreed to Neurosurgeon and would like to see Dr. Jeffrey William. Please see pended referral.

## 2024-08-14 ENCOUNTER — PATIENT MESSAGE (OUTPATIENT)
Dept: FAMILY MEDICINE | Facility: CLINIC | Age: 79
End: 2024-08-14
Payer: MEDICARE

## 2024-08-16 ENCOUNTER — TELEPHONE (OUTPATIENT)
Dept: UROLOGY | Facility: CLINIC | Age: 79
End: 2024-08-16
Payer: MEDICARE

## 2024-08-16 ENCOUNTER — HOSPITAL ENCOUNTER (EMERGENCY)
Facility: HOSPITAL | Age: 79
Discharge: HOME | End: 2024-08-16
Attending: EMERGENCY MEDICINE
Payer: MEDICARE

## 2024-08-16 VITALS
RESPIRATION RATE: 18 BRPM | OXYGEN SATURATION: 99 % | WEIGHT: 165 LBS | TEMPERATURE: 98 F | HEART RATE: 46 BPM | SYSTOLIC BLOOD PRESSURE: 145 MMHG | DIASTOLIC BLOOD PRESSURE: 65 MMHG | BODY MASS INDEX: 25.09 KG/M2

## 2024-08-16 DIAGNOSIS — R31.9 HEMATURIA, UNSPECIFIED TYPE: Primary | ICD-10-CM

## 2024-08-16 DIAGNOSIS — N30.01 ACUTE CYSTITIS WITH HEMATURIA: ICD-10-CM

## 2024-08-16 LAB
ANION GAP SERPL CALC-SCNC: 9 MMOL/L (ref 8–16)
BACTERIA #/AREA URNS HPF: ABNORMAL /HPF
BASOPHILS # BLD AUTO: 0.02 K/UL (ref 0–0.2)
BASOPHILS NFR BLD: 0.5 % (ref 0–1.9)
BILIRUB UR QL STRIP: ABNORMAL
BUN SERPL-MCNC: 10 MG/DL (ref 8–23)
CALCIUM SERPL-MCNC: 8.7 MG/DL (ref 8.7–10.5)
CHLORIDE SERPL-SCNC: 106 MMOL/L (ref 95–110)
CLARITY UR: ABNORMAL
CO2 SERPL-SCNC: 24 MMOL/L (ref 23–29)
COLOR UR: ABNORMAL
CREAT SERPL-MCNC: 0.9 MG/DL (ref 0.5–1.4)
DIFFERENTIAL METHOD BLD: ABNORMAL
EOSINOPHIL # BLD AUTO: 0.3 K/UL (ref 0–0.5)
EOSINOPHIL NFR BLD: 7 % (ref 0–8)
ERYTHROCYTE [DISTWIDTH] IN BLOOD BY AUTOMATED COUNT: 12.5 % (ref 11.5–14.5)
EST. GFR  (NO RACE VARIABLE): >60 ML/MIN/1.73 M^2
GLUCOSE SERPL-MCNC: 81 MG/DL (ref 70–110)
GLUCOSE UR QL STRIP: ABNORMAL
HCT VFR BLD AUTO: 42.8 % (ref 40–54)
HGB BLD-MCNC: 14.5 G/DL (ref 14–18)
HGB UR QL STRIP: ABNORMAL
IMM GRANULOCYTES # BLD AUTO: 0.01 K/UL (ref 0–0.04)
IMM GRANULOCYTES NFR BLD AUTO: 0.2 % (ref 0–0.5)
KETONES UR QL STRIP: ABNORMAL
LEUKOCYTE ESTERASE UR QL STRIP: ABNORMAL
LYMPHOCYTES # BLD AUTO: 1 K/UL (ref 1–4.8)
LYMPHOCYTES NFR BLD: 23.3 % (ref 18–48)
MCH RBC QN AUTO: 31.3 PG (ref 27–31)
MCHC RBC AUTO-ENTMCNC: 33.9 G/DL (ref 32–36)
MCV RBC AUTO: 92 FL (ref 82–98)
MICROSCOPIC COMMENT: ABNORMAL
MONOCYTES # BLD AUTO: 0.5 K/UL (ref 0.3–1)
MONOCYTES NFR BLD: 12.1 % (ref 4–15)
NEUTROPHILS # BLD AUTO: 2.4 K/UL (ref 1.8–7.7)
NEUTROPHILS NFR BLD: 56.9 % (ref 38–73)
NITRITE UR QL STRIP: ABNORMAL
NRBC BLD-RTO: 0 /100 WBC
PH UR STRIP: ABNORMAL [PH] (ref 5–8)
PLATELET # BLD AUTO: 204 K/UL (ref 150–450)
PMV BLD AUTO: 9.3 FL (ref 9.2–12.9)
POTASSIUM SERPL-SCNC: 3.9 MMOL/L (ref 3.5–5.1)
PROT UR QL STRIP: ABNORMAL
RBC # BLD AUTO: 4.64 M/UL (ref 4.6–6.2)
RBC #/AREA URNS HPF: >100 /HPF (ref 0–4)
SODIUM SERPL-SCNC: 139 MMOL/L (ref 136–145)
SP GR UR STRIP: ABNORMAL (ref 1–1.03)
URN SPEC COLLECT METH UR: ABNORMAL
UROBILINOGEN UR STRIP-ACNC: ABNORMAL EU/DL
WBC # BLD AUTO: 4.29 K/UL (ref 3.9–12.7)
WBC #/AREA URNS HPF: 5 /HPF (ref 0–5)

## 2024-08-16 PROCEDURE — 81000 URINALYSIS NONAUTO W/SCOPE: CPT | Performed by: EMERGENCY MEDICINE

## 2024-08-16 PROCEDURE — 25000003 PHARM REV CODE 250: Performed by: EMERGENCY MEDICINE

## 2024-08-16 PROCEDURE — 99284 EMERGENCY DEPT VISIT MOD MDM: CPT

## 2024-08-16 PROCEDURE — 85025 COMPLETE CBC W/AUTO DIFF WBC: CPT | Performed by: EMERGENCY MEDICINE

## 2024-08-16 PROCEDURE — 80048 BASIC METABOLIC PNL TOTAL CA: CPT | Performed by: EMERGENCY MEDICINE

## 2024-08-16 RX ORDER — CEPHALEXIN 500 MG/1
500 CAPSULE ORAL EVERY 8 HOURS
Qty: 21 CAPSULE | Refills: 0 | Status: SHIPPED | OUTPATIENT
Start: 2024-08-16 | End: 2024-08-23

## 2024-08-16 RX ORDER — CEPHALEXIN 250 MG/1
500 CAPSULE ORAL
Status: COMPLETED | OUTPATIENT
Start: 2024-08-16 | End: 2024-08-16

## 2024-08-16 RX ORDER — HYDROCODONE BITARTRATE AND ACETAMINOPHEN 5; 325 MG/1; MG/1
1 TABLET ORAL EVERY 8 HOURS PRN
Qty: 5 TABLET | Refills: 0 | Status: SHIPPED | OUTPATIENT
Start: 2024-08-16

## 2024-08-16 RX ORDER — ONDANSETRON 4 MG/1
4 TABLET, FILM COATED ORAL EVERY 12 HOURS PRN
Qty: 12 TABLET | Refills: 0 | Status: SHIPPED | OUTPATIENT
Start: 2024-08-16

## 2024-08-16 RX ADMIN — CEPHALEXIN 500 MG: 250 CAPSULE ORAL at 08:08

## 2024-08-16 NOTE — TELEPHONE ENCOUNTER
----- Message from Madeline Hendrickson sent at 8/16/2024 10:11 AM CDT -----  Regarding: FW: [facility] Appointment Request  Contact: patient at [phone]    ----- Message -----  From: Constanza Perez  Sent: 8/16/2024  10:09 AM CDT  To: [facility]  Subject: [facility] Appointment Request                       Type:  [facility] Appointment Request    Name of Caller:  patient at [phone]    When is the first available appointment?  none  Symptoms:  blood in urine, ED follow up    Additional Information:  Please call and advise. Thank you

## 2024-08-16 NOTE — ED PROVIDER NOTES
Encounter Date: 2024       History     Chief Complaint   Patient presents with    Hematuria     Pt reports blood in urine this am. Denies any pain.     79-year-old male past history of hypertension, high cholesterol, diagnosed with coronavirus 24 hours ago and recent cough cold congestion states that he woke up this morning urinated without difficulty in the urinated again had gross blood.  States he has never had this before.  Has some mild dysuria.  No pressure.  No fever.  No vomiting.  No back pain.  No other exacerbating or relieving factors identified.  No history of anticoagulation      Review of patient's allergies indicates:   Allergen Reactions    Lisinopril Anaphylaxis     Other reaction(s): Cough  PT REQUEST REMOVAL OF THIS MEDICATION    Grass pollen- grass standard Other (See Comments)     Past Medical History:   Diagnosis Date    High cholesterol     Hypertension     Vertigo      Past Surgical History:   Procedure Laterality Date    COLON SURGERY      COLONOSCOPY  2024    done at [facility]    pallup removal      SHOULDER SURGERY Right     SPINAL FUSION       Family History   Problem Relation Name Age of Onset    Cancer Mother Jazz Dedeaux     Hypertension Mother Jazz Oconnoreaux     Vision loss Mother Jazz Dedeaux     Hypertension Father Crow Renee     Stroke Father Crow Renee      Social History     Tobacco Use    Smoking status: Former     Current packs/day: 0.00     Average packs/day: 1 pack/day for 42.3 years (42.3 ttl pk-yrs)     Types: Cigarettes     Start date: 3/15/1964     Quit date: 2006     Years since quittin.1    Smokeless tobacco: Never   Substance Use Topics    Alcohol use: No    Drug use: No     Review of Systems   Constitutional:  Negative for fever.   HENT:  Negative for sore throat.    Respiratory:  Negative for shortness of breath.    Cardiovascular:  Negative for chest pain.   Gastrointestinal:  Negative for nausea.   Genitourinary:   Positive for dysuria and hematuria.   Musculoskeletal:  Negative for back pain.   Skin:  Negative for rash.   Neurological:  Negative for weakness.   Hematological:  Does not bruise/bleed easily.       Physical Exam     Initial Vitals [08/16/24 0810]   BP Pulse Resp Temp SpO2   135/64 (!) 59 16 98.4 °F (36.9 °C) 95 %      MAP       --         Physical Exam    Nursing note and vitals reviewed.  Constitutional: He appears well-developed and well-nourished.   HENT:   Head: Normocephalic and atraumatic.   Eyes: EOM are normal. Pupils are equal, round, and reactive to light.   Neck: Neck supple.   Normal range of motion.  Cardiovascular:  Normal rate, regular rhythm and normal heart sounds.           No murmur heard.  Pulmonary/Chest: Breath sounds normal. No respiratory distress.   Abdominal: Abdomen is soft. Bowel sounds are normal.   Genitourinary:    Genitourinary Comments: Small amount of dark red blood at the urethral meatus.  No swelling, tenderness appreciated.  Penile scrotal exam otherwise unremarkable.     Musculoskeletal:      Cervical back: Normal range of motion and neck supple.     Neurological: He is alert and oriented to person, place, and time. He has normal strength. No cranial nerve deficit or sensory deficit.   Skin: Skin is warm and dry.   Psychiatric: He has a normal mood and affect.         ED Course   Procedures  Labs Reviewed   URINALYSIS, REFLEX TO URINE CULTURE - Abnormal       Result Value    Specimen UA Urine, Unspecified      Color, UA Red (*)     Appearance, UA Cloudy (*)     pH, UA SEE COMMENT      Specific [place], UA SEE COMMENT      Protein, UA SEE COMMENT      Glucose, UA SEE COMMENT      Ketones, UA SEE COMMENT      Bilirubin (UA) SEE COMMENT      Occult Blood UA SEE COMMENT      Nitrite, UA SEE COMMENT      Urobilinogen, UA SEE COMMENT      Leukocytes, UA SEE COMMENT      Narrative:     Preferred Collection Type->Urine, Clean Catch  Specimen Source->Urine   CBC W/ AUTO DIFFERENTIAL  - Abnormal    WBC 4.29      RBC 4.64      Hemoglobin 14.5      Hematocrit 42.8      MCV 92      MCH 31.3 (*)     MCHC 33.9      RDW 12.5      Platelets 204      MPV 9.3      Immature Granulocytes 0.2      Gran # (ANC) 2.4      Immature Grans (Abs) 0.01      Lymph # 1.0      Mono # 0.5      Eos # 0.3      Baso # 0.02      nRBC 0      Gran % 56.9      Lymph % 23.3      Mono % 12.1      Eosinophil % 7.0      Basophil % 0.5      Differential Method Automated     URINALYSIS MICROSCOPIC - Abnormal    RBC, UA >100 (*)     WBC, UA 5      Bacteria Rare      Microscopic Comment SEE COMMENT      Narrative:     Preferred Collection Type->Urine, Clean Catch  Specimen Source->Urine   BASIC METABOLIC PANEL    Sodium 139      Potassium 3.9      Chloride 106      CO2 24      Glucose 81      BUN 10      Creatinine 0.9      Calcium 8.7      Anion Gap 9      eGFR >60.0            Imaging Results    None          Medications   cephALEXin capsule 500 mg (500 mg Oral Given 8/16/24 0835)     Medical Decision Making  Differential diagnosis; UTI, prostatitis, bladder CA    Patient well-appearing and in no obvious distress.  We will check basic labs, check a bladder scan, give dose of antibiotics and reassess.  Patient states he is currently able to urinate    Normal CBC, chemistry.  Urinalysis shows hematuria but no large infection.  Given a dose of oral antibiotics empirically.  Bladder scan performed in 194 on it.  Patient freely urinating afterwards without difficulty although he does have some dysuria.  Urine appears to be clearing up.  No indication for Garcia catheter at this time.  Although I did discuss the signs and symptoms of bladder obstruction.  We will discharge home on antibiotic, outpatient follow up with Urology follow up and return instructions given well-appearing and nontoxic.    Amount and/or Complexity of Data Reviewed  Labs: ordered.    Risk  Prescription drug management.                                      Clinical  Impression:  Final diagnoses:  [R31.9] Hematuria, unspecified type (Primary)  [N30.01] Acute cystitis with hematuria          ED Disposition Condition    Discharge Stable          ED Prescriptions       Medication Sig Dispense Start Date End Date Auth. Provider    cephALEXin (KEFLEX) 500 MG capsule Take 1 capsule (500 mg total) by mouth every 8 (eight) hours. for 7 days 21 capsule 8/16/2024 8/23/2024 Jagjit Montalvo MD    ondansetron (ZOFRAN) 4 MG tablet Take 1 tablet (4 mg total) by mouth every 12 (twelve) hours as needed for Nausea. 12 tablet 8/16/2024 -- Jagjit Montalvo MD    HYDROcodone-acetaminophen (NORCO) 5-325 mg per tablet Take 1 tablet by mouth every 8 (eight) hours as needed for Pain. 5 tablet 8/16/2024 -- Jagjit Montalvo MD          Follow-up Information       Follow up With Specialties Details Why Contact Info    Lyric Richardson MD Urology In 3 days  [address] [facility] [place] [address] [phone]               Jagjit Montalvo MD  08/17/24 1012

## 2024-08-19 ENCOUNTER — PATIENT OUTREACH (OUTPATIENT)
Dept: EMERGENCY MEDICINE | Facility: HOSPITAL | Age: 79
End: 2024-08-19

## 2024-08-20 ENCOUNTER — OFFICE VISIT (OUTPATIENT)
Dept: PULMONOLOGY | Facility: CLINIC | Age: 79
End: 2024-08-20
Payer: MEDICARE

## 2024-08-20 VITALS
SYSTOLIC BLOOD PRESSURE: 134 MMHG | DIASTOLIC BLOOD PRESSURE: 57 MMHG | BODY MASS INDEX: 24.97 KG/M2 | OXYGEN SATURATION: 96 % | WEIGHT: 164.25 LBS | HEART RATE: 48 BPM

## 2024-08-20 DIAGNOSIS — J44.9 MILD CHRONIC OBSTRUCTIVE PULMONARY DISEASE: Primary | ICD-10-CM

## 2024-08-20 PROCEDURE — 3078F DIAST BP <80 MM HG: CPT | Mod: CPTII,S$GLB,, | Performed by: STUDENT IN AN ORGANIZED HEALTH CARE EDUCATION/TRAINING PROGRAM

## 2024-08-20 PROCEDURE — 1126F AMNT PAIN NOTED NONE PRSNT: CPT | Mod: CPTII,S$GLB,, | Performed by: STUDENT IN AN ORGANIZED HEALTH CARE EDUCATION/TRAINING PROGRAM

## 2024-08-20 PROCEDURE — 3075F SYST BP GE 130 - 139MM HG: CPT | Mod: CPTII,S$GLB,, | Performed by: STUDENT IN AN ORGANIZED HEALTH CARE EDUCATION/TRAINING PROGRAM

## 2024-08-20 PROCEDURE — 3288F FALL RISK ASSESSMENT DOCD: CPT | Mod: CPTII,S$GLB,, | Performed by: STUDENT IN AN ORGANIZED HEALTH CARE EDUCATION/TRAINING PROGRAM

## 2024-08-20 PROCEDURE — 1101F PT FALLS ASSESS-DOCD LE1/YR: CPT | Mod: CPTII,S$GLB,, | Performed by: STUDENT IN AN ORGANIZED HEALTH CARE EDUCATION/TRAINING PROGRAM

## 2024-08-20 PROCEDURE — 99212 OFFICE O/P EST SF 10 MIN: CPT | Mod: S$GLB,,, | Performed by: STUDENT IN AN ORGANIZED HEALTH CARE EDUCATION/TRAINING PROGRAM

## 2024-08-20 PROCEDURE — 99999 PR PBB SHADOW E&M-EST. PATIENT-LVL II: CPT | Mod: PBBFAC,,, | Performed by: STUDENT IN AN ORGANIZED HEALTH CARE EDUCATION/TRAINING PROGRAM

## 2024-08-20 RX ORDER — ALBUTEROL SULFATE 90 UG/1
2 INHALANT RESPIRATORY (INHALATION) EVERY 6 HOURS PRN
Qty: 17 G | Refills: 9 | Status: SHIPPED | OUTPATIENT
Start: 2024-08-20

## 2024-08-20 RX ORDER — TIOTROPIUM BROMIDE INHALATION SPRAY 3.12 UG/1
2 SPRAY, METERED RESPIRATORY (INHALATION) DAILY
Qty: 4 G | Refills: 9 | Status: SHIPPED | OUTPATIENT
Start: 2024-08-20

## 2024-08-30 ENCOUNTER — OFFICE VISIT (OUTPATIENT)
Dept: UROLOGY | Facility: CLINIC | Age: 79
End: 2024-08-30
Payer: MEDICARE

## 2024-08-30 VITALS — HEIGHT: 68 IN | BODY MASS INDEX: 23.49 KG/M2 | WEIGHT: 155 LBS

## 2024-08-30 DIAGNOSIS — R31.0 GROSS HEMATURIA: Primary | ICD-10-CM

## 2024-08-30 DIAGNOSIS — R39.12 BENIGN PROSTATIC HYPERPLASIA WITH WEAK URINARY STREAM: ICD-10-CM

## 2024-08-30 DIAGNOSIS — R97.20 ELEVATED PSA: ICD-10-CM

## 2024-08-30 DIAGNOSIS — N40.1 BENIGN PROSTATIC HYPERPLASIA WITH WEAK URINARY STREAM: ICD-10-CM

## 2024-08-30 LAB
BILIRUBIN, UA POC OHS: NEGATIVE
BLOOD, UA POC OHS: NEGATIVE
CLARITY, UA POC OHS: CLEAR
COLOR, UA POC OHS: YELLOW
GLUCOSE, UA POC OHS: NEGATIVE
KETONES, UA POC OHS: NEGATIVE
LEUKOCYTES, UA POC OHS: NEGATIVE
NITRITE, UA POC OHS: NEGATIVE
PH, UA POC OHS: 7
PROTEIN, UA POC OHS: NEGATIVE
SPECIFIC GRAVITY, UA POC OHS: 1.01
UROBILINOGEN, UA POC OHS: 0.2

## 2024-08-30 PROCEDURE — 99999 PR PBB SHADOW E&M-EST. PATIENT-LVL II: CPT | Mod: PBBFAC,,, | Performed by: UROLOGY

## 2024-08-30 PROCEDURE — 87086 URINE CULTURE/COLONY COUNT: CPT | Performed by: UROLOGY

## 2024-08-30 RX ORDER — FINASTERIDE 5 MG/1
5 TABLET, FILM COATED ORAL DAILY
Qty: 90 TABLET | Refills: 3 | Status: SHIPPED | OUTPATIENT
Start: 2024-08-30 | End: 2025-08-30

## 2024-08-30 RX ORDER — TAMSULOSIN HYDROCHLORIDE 0.4 MG/1
0.8 CAPSULE ORAL DAILY
Qty: 180 CAPSULE | Refills: 3 | Status: SHIPPED | OUTPATIENT
Start: 2024-08-30 | End: 2025-08-30

## 2024-08-30 NOTE — PROGRESS NOTES
[facility] Established Patient/H&P:    Madi Renee is a 79 y.o. male who presents for lower urinary tract symptoms.     Patient with a history of HTN and LUTS who presents to establish care.      He reports a several year history of lower urinary tract symptoms - incomplete emptying, intermittency, weak stream, urgency and straining previously managed by Dr. Charles Vargas.      He is on Flomax 0.4 mg and Finasteride 5 mg. Only on Finasteride 3 times weekly. 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 Not significantly bothered by his symptoms.      Per patient, he had balanitis in the past that was refractory to topical therapies. He underwent circumcision with Dr. Vargas. Patient states that he believes the pathology resulted skin cancer, but no record available. He has not had any therapies thereafter.      Urine dipstick with trace leukocytes today.     Retired . [employer] . Smoked 1 ppd for 50 years - quit in approximately 2004.      Interval History    8/30/24: Offered cystoscopy and TRUS for his LUTS at initial visit, but declined. Also increased Flomax to 0.8 and instructed to continue Finasteride. Here for follow up. He presented to the ED on 8/16/24 with gross hematuria after a COVID diagnosis with associated dysuria. UA micro with >100 RBC and 5 WBC. No urine culture performed. Discharged home with Keflex, Norco and Zofran. Hematuria resolved after Abx. Urine dipstick negative today. Remains on Flomax 0.8 mg and Finasteride 5 mg PO daily with stable lower urinary tract symptoms.      Denies any fever, chills, flank pain, bone pain, unintentional weight loss,  trauma or history of  malignancy.         PSA*  2.0  4/16/24  2.2                   2/22/23  2.6                   8/20/21  1.7                   11/12/18     A1C  5.4                   5/19/22     IPSS QoL  22 3 11/27/23     PVR  14 mL               "11/27/23        *Finasteride    Past Medical History:   Diagnosis Date    High cholesterol     Hypertension     Vertigo        Past Surgical History:   Procedure Laterality Date    COLON SURGERY      COLONOSCOPY  04/23/2024    done at [facility]    pallup removal      SHOULDER SURGERY Right     SPINAL FUSION         Review of patient's allergies indicates:   Allergen Reactions    Lisinopril Anaphylaxis     Other reaction(s): Cough  PT REQUEST REMOVAL OF THIS MEDICATION    Grass pollen-june grass standard Other (See Comments)       Medications Reviewed: see MAR    FOCUSED PHYSICAL EXAM:    There were no vitals filed for this visit.  Body mass index is 23.57 kg/m². Weight: 70.3 kg (155 lb) Height: 5' 8" (172.7 cm)       General: Alert, cooperative, no distress, appears stated age  Abdomen: Soft, non-tender, no CVA tenderness, non-distended      LABS:    Recent Results (from the past 336 hour(s))   POCT Urinalysis(Instrument)    Collection Time: 08/30/24  1:52 PM   Result Value Ref Range    Color, POC UA Yellow Yellow, Straw, Colorless    Clarity, POC UA Clear Clear    Glucose, POC UA Negative Negative    Bilirubin, POC UA Negative Negative    Ketones, POC UA Negative Negative    Spec Grav POC UA 1.015 1.005 - 1.030    Blood, POC UA Negative Negative    pH, POC UA 7.0 5.0 - 8.0    Protein, POC UA Negative Negative    Urobilinogen, POC UA 0.2 <=1.0    Nitrite, POC UA Negative Negative    WBC, POC UA Negative Negative             Assessment/Diagnosis:    1. Gross hematuria  Urine culture    Cytology, Urine    Creatinine, Serum    CT Urogram Abd Pelvis W WO    Procedure Order to Urology      2. Benign prostatic hyperplasia with weak urinary stream  POCT Urinalysis(Instrument)      3. Elevated PSA            Plans:    - I spent 40 minutes of the day of this encounter preparing for, treating and managing this patient. Visit today included increased complexity associated with the care of the episodic problem addressed and " managing the longitudinal care of the patient due to the serious and/or complex managed problem(s) gross hematuria, weak urinary stream and elevated PSA. We discussed the etiology and management of the patient's gross hematuria. Explained that hematuria is multi-factorial and can be secondary to  cancer, obstructive uropathy, nephritis,  trauma,  infections, thrombosis, nephrolithiasis, bleeding disorders and medications. We discussed hematuria work-up and the benefit of further evaluation with cystourethroscopy and CT urogram to rule out any malignancy, stones or other pathology. All risks, benefits and alternatives discussed at length and all questions answered.    - Urine culture and cytology today.   - CT urogram next available.   - Cystoscopy and TRUS on 9/25/24 at the [facility].   - Continue Flomax 0.8 mg PO daily and Finasteride 5 mg PO daily. Refills ordered.

## 2024-09-03 DIAGNOSIS — N40.1 BENIGN PROSTATIC HYPERPLASIA WITH WEAK URINARY STREAM: Primary | ICD-10-CM

## 2024-09-03 DIAGNOSIS — R39.12 BENIGN PROSTATIC HYPERPLASIA WITH WEAK URINARY STREAM: Primary | ICD-10-CM

## 2024-09-03 NOTE — PROGRESS NOTES
Procedure Order to Urology [[id number]]    Electronically signed by: Pam Ortiz Jr., MD on 08/30/24 1406 Status: Active   Ordering user: Pam Ortiz Jr., MD 08/30/24 1406 Authorized by: Pam Ortiz Jr., MD   Ordering mode: Standard   Frequency:  08/30/24 -     Diagnoses  Gross hematuria [R31.0]   Questionnaire    Question Answer   Procedure Cystoscopy  TRUS/Trans Rectal Ultrasound   Pre-op Diagnosis Gross hematuria   Facility Name: [facility]   Order comments: Cysto and TRUS on 9/25/24 at [facility].   ASC, Please order Urine POCT without micro . Local sedation (no urine Dr Ortiz or Dr panday) /trus

## 2024-09-06 ENCOUNTER — HOSPITAL ENCOUNTER (OUTPATIENT)
Dept: RADIOLOGY | Facility: HOSPITAL | Age: 79
Discharge: HOME | End: 2024-09-06
Attending: UROLOGY
Payer: MEDICARE

## 2024-09-06 DIAGNOSIS — R31.0 GROSS HEMATURIA: ICD-10-CM

## 2024-09-06 PROCEDURE — 74178 CT ABD&PLV WO CNTR FLWD CNTR: CPT | Mod: TC

## 2024-09-06 PROCEDURE — 74178 CT ABD&PLV WO CNTR FLWD CNTR: CPT | Mod: 26,,, | Performed by: RADIOLOGY

## 2024-09-06 PROCEDURE — 25500020 PHARM REV CODE 255: Performed by: UROLOGY

## 2024-09-06 RX ADMIN — IOHEXOL 100 ML: 350 INJECTION, SOLUTION INTRAVENOUS at 10:09

## 2024-09-19 ENCOUNTER — TELEPHONE (OUTPATIENT)
Dept: UROLOGY | Facility: CLINIC | Age: 79
End: 2024-09-19
Payer: MEDICARE

## 2024-09-19 NOTE — TELEPHONE ENCOUNTER
----- Message from Elvie Huang sent at 9/19/2024  2:04 PM CDT -----  Type:  Needs Medical Advice    Who Called:  Pt    Would the patient rather a call back or a response via [facility]?  Call back    Best Call Back Number:  [phone]    Additional Information:  Pt does not need to reschedule or cancel procedure 9/25/24.   Please call back to advise. Thanks!

## 2024-09-19 NOTE — TELEPHONE ENCOUNTER
Spoke with pt. Pt stated that he spoke with the billing office and told him that he have to pay in installments. Pt is trying to see if his procedure is good to go. Informed pt that I will route the message to Dr. Ortiz. Pt verbalized understanding.

## 2024-09-20 NOTE — DISCHARGE INSTRUCTIONS
After the procedure    Drink plenty of fluids.  You may have burning or light bleeding when you urinate--this is normal.  Medications may be prescribed to ease any discomfort or prevent infection. Take these as directed.  Call your doctor if you have heavy bleeding or blood clots, burning that lasts more than a day, a fever over 100°F  (38° C), or trouble urinating.    After Surgery:  Always be aware that any surgery can cause these symptoms:    Pain- Medication can be prescribed for pain to decrease your pain but may not completely take your pain away.  Over the Counter pain medicine my be enough and you can always use Ice and rest to help ease pain.    Bleeding- a little bleeding after a surgery is usually within normal.  If there is a lot of blood you need to notify your MD.  Emergency treatments of bleeding are cold application, elevation of the bleeding site and compression.    Infection- Infection after surgery is NOT a normal occurrence.  Signs of infection are fever, swelling, hot to touch the incision.  If this occurs notify your MD immediately.    Nausea- this can be common after a surgery especially if you have had anesthesia medicine or are taking pain medicine.  Staying on clear liquids, bland foods, gingerale, or over the counter anti nausea medicines can help.  If you vomit more than once, notify your MD.  Anti Nausea medicines can be prescribed.        Transrectal Ultrasound   A transrectal ultrasound is an imaging test. It uses sound waves to create pictures of a mans prostate gland to determine its size.Your prostate gland is in front of your rectum and if too large may become inflamed and impede the flow of urine. For this test, a special probe (transducer) is placed directly into your rectum.     Before leaving, you may need to wait for a short time while the images are reviewed. In most cases, you can go back to your normal routine after the test.

## 2024-09-25 ENCOUNTER — HOSPITAL ENCOUNTER (OUTPATIENT)
Facility: HOSPITAL | Age: 79
Discharge: HOME | End: 2024-09-25
Attending: UROLOGY | Admitting: UROLOGY
Payer: MEDICARE

## 2024-09-25 DIAGNOSIS — R39.12 BENIGN PROSTATIC HYPERPLASIA WITH WEAK URINARY STREAM: Primary | ICD-10-CM

## 2024-09-25 DIAGNOSIS — N40.1 BENIGN PROSTATIC HYPERPLASIA WITH WEAK URINARY STREAM: Primary | ICD-10-CM

## 2024-09-25 DIAGNOSIS — N40.0 BENIGN PROSTATIC HYPERPLASIA WITHOUT LOWER URINARY TRACT SYMPTOMS: ICD-10-CM

## 2024-09-25 PROCEDURE — 52000 CYSTOURETHROSCOPY: CPT | Performed by: UROLOGY

## 2024-09-25 PROCEDURE — 52000 CYSTOURETHROSCOPY: CPT | Mod: ,,, | Performed by: UROLOGY

## 2024-09-25 PROCEDURE — 76872 US TRANSRECTAL: CPT | Mod: 26,,, | Performed by: UROLOGY

## 2024-09-25 PROCEDURE — A4217 STERILE WATER/SALINE, 500 ML: HCPCS | Performed by: UROLOGY

## 2024-09-25 PROCEDURE — 76872 US TRANSRECTAL: CPT | Performed by: UROLOGY

## 2024-09-25 PROCEDURE — 25000003 PHARM REV CODE 250: Performed by: UROLOGY

## 2024-09-25 RX ORDER — LIDOCAINE HYDROCHLORIDE 20 MG/ML
JELLY TOPICAL
Status: DISCONTINUED | OUTPATIENT
Start: 2024-09-25 | End: 2024-09-25 | Stop reason: HOSPADM

## 2024-09-25 RX ORDER — SULFAMETHOXAZOLE AND TRIMETHOPRIM 800; 160 MG/1; MG/1
1 TABLET ORAL 2 TIMES DAILY
Qty: 6 TABLET | Refills: 0 | Status: SHIPPED | OUTPATIENT
Start: 2024-09-25 | End: 2024-09-28

## 2024-09-25 RX ORDER — WATER 1 ML/ML
IRRIGANT IRRIGATION
Status: DISCONTINUED | OUTPATIENT
Start: 2024-09-25 | End: 2024-09-25 | Stop reason: HOSPADM

## 2024-09-25 NOTE — OP NOTE
[facility]  Operative/Discharge Note    Date: 09/25/2024    Pre-Op Diagnosis: Gross hematuria    Post-Op Diagnosis: Gross hematuria, urethral stricture    Procedure(s) Performed:   1.  Cystoscopy  2.  Transrectal ultrasound     Specimen(s): None    Staff Surgeon: Pam Ortiz MD    Assistant Surgeon: Pam Ortiz Jr, MD    Anesthesia: Local anesthesia topical 2% lidocaine gel    Indications: Madi Renee is a 79 y.o. male with gross hematuria.     Findings: 71 cc prostate with trilobar hypertrophy. 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 Bladder with mild trabeculations. No diverticula.     Estimated Blood Loss: min    Drains: None    AV: Anodular, smooth    Procedure in Detail:  After risks, benefits and possible complications of cystoscopy and TRUS were explained, the patient elected to undergo the procedure and informed consent was obtained. All questions were answered in the jaron-operative area. The patient was transferred to the cystoscopy suite and placed in the lateral position.     TRUS probe was inserted into the rectum and the prostate measurement was 71 cc.     The patient was placed in the lithotomy position and then prepped and draped in the usual sterile fashion. Lidocaine jelly was administered for local anesthesia. Time out was performed.    A flexible cystoscope was introduced into the bladder per urethra. There was mild meatal stenosis present. Also with two 12 Turkmen urethral strictures (penile and bulbar) that were traversed with the scope.  The prostate was 4 cm in length and appeared obstructing with trilobar hypertrophy. The right and left ureteral orifices were identified in the normal anatomic position and were seen effluxing clear urine.  Formal cystoscopy was performed which revealed no masses or lesions suspicious for malignancy, mild trabeculations, no bladder stones and no bladder  diverticula.      The patient tolerated the procedure well and was transferred to recovery in stable condition.    Disposition: Home    Discharge home today status post uncomplicated procedure as above  Diet - resume home diet  Follow up: Continue Flomax 0.8 mg and Finasteride 5 mg PO daily. RTC in 3 months with symptom score and PVR. May benefit from surgical management if hematuria recurs.   Instructions: Bactrim x 3 days.   Meds:     Medication List        START taking these medications      sulfamethoxazole-trimethoprim 800-160mg 800-160 mg Tab  Commonly known as: BACTRIM DS  Take 1 tablet by mouth 2 (two) times daily. for 3 days            CONTINUE taking these medications      albuterol 90 mcg/actuation inhaler  Commonly known as: VENTOLIN HFA  Inhale 2 puffs into the lungs every 6 (six) hours as needed for Wheezing. Rescue     brimonidine 0.2% 0.2 % Drop  Commonly known as: ALPHAGAN     dorzolamide-timolol 2-0.5% 22.3-6.8 mg/mL ophthalmic solution  Commonly known as: COSOPT     ergocalciferol 50,000 unit Cap  Commonly known as: ERGOCALCIFEROL  Take 1 capsule (50,000 Units total) by mouth every 7 days.     fexofenadine 180 MG tablet  Commonly known as: ALLEGRA  Take 1 tablet (180 mg total) by mouth once daily.     finasteride 5 mg tablet  Commonly known as: PROSCAR  Take 1 tablet (5 mg total) by mouth once daily.     fluticasone propionate 50 mcg/actuation nasal spray  Commonly known as: FLONASE  2 sprays (100 mcg total) by Each Nostril route once daily.     HYDROcodone-acetaminophen 5-325 mg per tablet  Commonly known as: NORCO  Take 1 tablet by mouth every 8 (eight) hours as needed for Pain.     LIDOcaine 5 %  Commonly known as: LIDODERM  Place 1 patch onto the skin once daily. Remove & Discard patch within 12 hours or as directed by MD     ondansetron 4 MG tablet  Commonly known as: ZOFRAN  Take 1 tablet (4 mg total) by mouth every 12 (twelve) hours as needed for Nausea.     pravastatin 40 MG  tablet  Commonly known as: PRAVACHOL  Take 1 tablet (40 mg total) by mouth once daily.     prednisoLONE acetate 1 % Drps  Commonly known as: PRED FORTE     SPIRIVA RESPIMAT 2.5 mcg/actuation inhaler  Generic drug: tiotropium bromide  Inhale 2 puffs into the lungs Daily. Controller     tamsulosin 0.4 mg Cap  Commonly known as: FLOMAX  Take 2 capsules (0.8 mg total) by mouth once daily.     valsartan 80 MG tablet  Commonly known as: DIOVAN  Take 1 tablet (80 mg total) by mouth once daily.               Where to Get Your Medications        These medications were sent to [facility] [address] [place] [address], [place] [address] [address]      Phone: [phone]   sulfamethoxazole-trimethoprim 800-160mg 800-160 mg Tab         Pam Ortiz Jr, MD

## 2024-09-26 VITALS
BODY MASS INDEX: 23.49 KG/M2 | TEMPERATURE: 98 F | WEIGHT: 155 LBS | DIASTOLIC BLOOD PRESSURE: 67 MMHG | RESPIRATION RATE: 18 BRPM | SYSTOLIC BLOOD PRESSURE: 165 MMHG | HEART RATE: 50 BPM | HEIGHT: 68 IN | OXYGEN SATURATION: 97 %

## 2024-10-11 ENCOUNTER — TELEPHONE (OUTPATIENT)
Dept: PULMONOLOGY | Facility: CLINIC | Age: 79
End: 2024-10-11
Payer: MEDICARE

## 2024-10-11 NOTE — TELEPHONE ENCOUNTER
As per patient's request.  CT scheduled for [facility] for 11/18/24 was cancelled and orders for CT with last progress notes were faxed ATTN:  Ena Madrigal [phone]. Patient was told needs to have CT on disk for follow up appt scheduled on 11/19/24 and to reschedule appointment if unable to have CT done before f/u.  He verbalized understanding.

## 2024-10-11 NOTE — TELEPHONE ENCOUNTER
----- Message from Lyric sent at 10/11/2024 12:50 PM CDT -----  Contact: self  Type: Needs Medical Advice  Who Called:  pt  Best Call Back Number: [phone]   Additional Information: pt would like orders for ct scan to be faxed to [facility]. Fax [phone] attn bib claudio please call

## 2024-10-15 DIAGNOSIS — J30.1 SEASONAL ALLERGIC RHINITIS DUE TO POLLEN: ICD-10-CM

## 2024-10-15 RX ORDER — FLUTICASONE PROPIONATE 50 MCG
2 SPRAY, SUSPENSION (ML) NASAL DAILY
Qty: 48 ML | Refills: 3 | Status: SHIPPED | OUTPATIENT
Start: 2024-10-15

## 2024-11-08 ENCOUNTER — OFFICE VISIT (OUTPATIENT)
Dept: PODIATRY | Facility: CLINIC | Age: 79
End: 2024-11-08
Payer: MEDICARE

## 2024-11-08 VITALS — WEIGHT: 159.38 LBS | BODY MASS INDEX: 24.15 KG/M2 | HEIGHT: 68 IN

## 2024-11-08 DIAGNOSIS — L60.0 INGROWN NAIL: Primary | ICD-10-CM

## 2024-11-08 RX ORDER — MONTELUKAST SODIUM 10 MG/1
10 TABLET ORAL
COMMUNITY
Start: 2024-08-27

## 2024-11-08 RX ORDER — NIRMATRELVIR AND RITONAVIR 300-100 MG
KIT ORAL
COMMUNITY
Start: 2024-08-14

## 2024-11-08 NOTE — PROGRESS NOTES
Subjective:     Patient ID: Madi Renee is a 79 y.o. male    Chief Complaint: Toe Pain       Madi is a 79 y.o. male who presents to the clinic complaining of painful ingrown toenail on both feet.  Patient reports several days duration pain tenderness bilateral 1st digit medial nail border.  Patient states he at his wife did out the nail in the area with subsequent mild improvement in pain but still some minor irritation after walking all day.  Patient currently rates pain 0/10    Review of Systems   Constitutional: Negative.  Negative for chills and fever.   Respiratory:  Negative for cough and shortness of breath.    Cardiovascular:  Negative for chest pain and leg swelling.   Gastrointestinal:  Negative for diarrhea, nausea and vomiting.   Neurological:  Negative for tingling.        Objective:   Physical Exam  Vitals reviewed.   Constitutional:       General: He is not in acute distress.     Appearance: Normal appearance. He is not ill-appearing.   HENT:      Head: Normocephalic.      Nose: Nose normal.   Cardiovascular:      Pulses:           Dorsalis pedis pulses are 2+ on the right side and 2+ on the left side.        Posterior tibial pulses are 2+ on the right side and 2+ on the left side.   Pulmonary:      Effort: Pulmonary effort is normal. No respiratory distress.   Musculoskeletal:      Right foot: No bunion.      Left foot: No bunion.   Skin:     Capillary Refill: Capillary refill takes 2 to 3 seconds.   Neurological:      Mental Status: He is alert and oriented to person, place, and time.   Psychiatric:         Mood and Affect: Mood normal.         Behavior: Behavior normal.         Thought Content: Thought content normal.        Foot Exam    General  General Appearance: appears stated age and healthy   Orientation: alert and oriented to person, place, and time   Affect: appropriate   Gait: unimpaired       Right Foot/Ankle     Inspection and Palpation  Ecchymosis: none  Tenderness: (Distal right 1st  medial nail border)  Swelling: none   Arch: normal  Hallux valgus: no  Skin Exam: skin intact;     Neurovascular  Dorsalis pedis: 2+  Posterior tibial: 2+  Saphenous nerve sensation: normal  Tibial nerve sensation: normal  Superficial peroneal nerve sensation: normal  Deep peroneal nerve sensation: normal  Sural nerve sensation: normal    Muscle Strength  Ankle dorsiflexion: 5  Ankle plantar flexion: 5  Ankle inversion: 5  Ankle eversion: 5  Great toe extension: 5  Great toe flexion: 5      Left Foot/Ankle      Inspection and Palpation  Ecchymosis: none  Tenderness: (Distal left 1st medial nail border)  Swelling: none   Arch: normal  Hallux valgus: no  Skin Exam: skin intact;     Neurovascular  Dorsalis pedis: 2+  Posterior tibial: 2+  Saphenous nerve sensation: normal  Tibial nerve sensation: normal  Superficial peroneal nerve sensation: normal  Deep peroneal nerve sensation: normal  Sural nerve sensation: normal    Muscle Strength  Ankle dorsiflexion: 5  Ankle plantar flexion: 5  Ankle inversion: 5  Ankle eversion: 5  Great toe extension: 5  Great toe flexion: 5      Dermatologic: Evidence of prior home/self debridement of nail and slant back configuration bilateral 1st digit medial nail border with hyperkeratotic skin within the distal medial nail fold but no acute signs of infection or drainage noted bilateral 1st digit     Assessment:         1. Ingrown nail       Plan:     Madi Renee was seen today for   Chief Complaint   Patient presents with    Toe Pain       Assessment & Plan           Procedures     1. Patient was examined and evaluated.    2. Discussed with patient irritation of the distal tip bilateral 1st digit medial nail border.  Patient made aware that it does not seem to have an acute ingrown toenail at this point but does have some mild hyperkeratotic skin in the distal nail fold.  Patient was advised to apply ointments moisturizer to the area and a dry sterile bandage.  Patient made aware that  pain maybe related to prior attempt at removal of nail in the area by his wife and it may take several days to improve.  Patient will apply pad and cushion for protection of the area.  Patient will monitor for progression of symptoms.  Patient did have a discussion about potential ingrown toenail procedures.  Questions were answered in the diagram was drawn for the patient.  3. Patient will follow-up p.r.n. for complaints      Francis Harrison DPM  [address] [place] [address]  [phone]      This note was created using [email or web address] direct voice recognition software. Note may have occasional typographical errors that may not have been identified and edited despite initial review prior to signing.

## 2024-11-13 ENCOUNTER — HOSPITAL ENCOUNTER (OUTPATIENT)
Dept: RADIOLOGY | Facility: HOSPITAL | Age: 79
Discharge: HOME | End: 2024-11-13
Attending: STUDENT IN AN ORGANIZED HEALTH CARE EDUCATION/TRAINING PROGRAM
Payer: MEDICARE

## 2024-11-13 ENCOUNTER — TELEPHONE (OUTPATIENT)
Dept: PULMONOLOGY | Facility: CLINIC | Age: 79
End: 2024-11-13
Payer: MEDICARE

## 2024-11-13 DIAGNOSIS — R91.1 SOLITARY PULMONARY NODULE: ICD-10-CM

## 2024-11-13 PROCEDURE — 71250 CT THORAX DX C-: CPT | Mod: 26,,, | Performed by: RADIOLOGY

## 2024-11-13 PROCEDURE — 71250 CT THORAX DX C-: CPT | Mod: TC

## 2024-11-13 NOTE — TELEPHONE ENCOUNTER
----- Message from Cristiane sent at 11/12/2024 11:02 AM CST -----  Contact: Madi   Madi would like a call back about getting orders for a CT scan

## 2024-11-19 ENCOUNTER — PATIENT MESSAGE (OUTPATIENT)
Dept: FAMILY MEDICINE | Facility: CLINIC | Age: 79
End: 2024-11-19
Payer: MEDICARE

## 2024-12-09 ENCOUNTER — LAB VISIT (OUTPATIENT)
Dept: LAB | Facility: HOSPITAL | Age: 79
End: 2024-12-09
Attending: FAMILY MEDICINE
Payer: MEDICARE

## 2024-12-09 ENCOUNTER — OFFICE VISIT (OUTPATIENT)
Dept: FAMILY MEDICINE | Facility: CLINIC | Age: 79
End: 2024-12-09
Payer: MEDICARE

## 2024-12-09 ENCOUNTER — TELEPHONE (OUTPATIENT)
Dept: FAMILY MEDICINE | Facility: CLINIC | Age: 79
End: 2024-12-09

## 2024-12-09 VITALS
OXYGEN SATURATION: 98 % | WEIGHT: 161.38 LBS | RESPIRATION RATE: 12 BRPM | HEIGHT: 68 IN | SYSTOLIC BLOOD PRESSURE: 102 MMHG | HEART RATE: 56 BPM | BODY MASS INDEX: 24.46 KG/M2 | DIASTOLIC BLOOD PRESSURE: 58 MMHG

## 2024-12-09 DIAGNOSIS — R13.19 ESOPHAGEAL DYSPHAGIA: Primary | ICD-10-CM

## 2024-12-09 DIAGNOSIS — E53.8 B12 DEFICIENCY: ICD-10-CM

## 2024-12-09 DIAGNOSIS — R01.1 HEART MURMUR: ICD-10-CM

## 2024-12-09 DIAGNOSIS — E55.9 VITAMIN D DEFICIENCY: ICD-10-CM

## 2024-12-09 LAB
25(OH)D3+25(OH)D2 SERPL-MCNC: 26 NG/ML (ref 30–96)
VIT B12 SERPL-MCNC: 223 PG/ML (ref 210–950)

## 2024-12-09 PROCEDURE — 82607 VITAMIN B-12: CPT | Performed by: FAMILY MEDICINE

## 2024-12-09 PROCEDURE — 99999 PR PBB SHADOW E&M-EST. PATIENT-LVL V: CPT | Mod: PBBFAC,,, | Performed by: FAMILY MEDICINE

## 2024-12-09 PROCEDURE — G2211 COMPLEX E/M VISIT ADD ON: HCPCS | Mod: S$GLB,,, | Performed by: FAMILY MEDICINE

## 2024-12-09 PROCEDURE — 3288F FALL RISK ASSESSMENT DOCD: CPT | Mod: CPTII,S$GLB,, | Performed by: FAMILY MEDICINE

## 2024-12-09 PROCEDURE — 3074F SYST BP LT 130 MM HG: CPT | Mod: CPTII,S$GLB,, | Performed by: FAMILY MEDICINE

## 2024-12-09 PROCEDURE — 82306 VITAMIN D 25 HYDROXY: CPT | Performed by: FAMILY MEDICINE

## 2024-12-09 PROCEDURE — 36415 COLL VENOUS BLD VENIPUNCTURE: CPT | Performed by: FAMILY MEDICINE

## 2024-12-09 PROCEDURE — 3078F DIAST BP <80 MM HG: CPT | Mod: CPTII,S$GLB,, | Performed by: FAMILY MEDICINE

## 2024-12-09 PROCEDURE — 99214 OFFICE O/P EST MOD 30 MIN: CPT | Mod: S$GLB,,, | Performed by: FAMILY MEDICINE

## 2024-12-09 PROCEDURE — 1126F AMNT PAIN NOTED NONE PRSNT: CPT | Mod: CPTII,S$GLB,, | Performed by: FAMILY MEDICINE

## 2024-12-09 PROCEDURE — 1101F PT FALLS ASSESS-DOCD LE1/YR: CPT | Mod: CPTII,S$GLB,, | Performed by: FAMILY MEDICINE

## 2024-12-09 PROCEDURE — 1159F MED LIST DOCD IN RCRD: CPT | Mod: CPTII,S$GLB,, | Performed by: FAMILY MEDICINE

## 2024-12-09 NOTE — PROGRESS NOTES
Patient ID: Madi Renee is a 79 y.o. male.    Chief Complaint: Follow-up (3 month)    History of Present Illness    CHIEF COMPLAINT:  Madi presents today for follow-up.    DYSPHAGIA:  He reports difficulty swallowing, particularly with certain foods, accompanied by associated coughing. He inquires about a possible connection between his diagnosed spinal stenosis and the swallowing difficulties.    RESPIRATORY:  He has a 50-year history of smoking. He uses Albuterol and Spiriva for respiratory management.    CARDIOVASCULAR:  He reports having a heart murmur present since at least 1964, though he is uncertain about its etiology. He follows up with Dr. Stuart and Dr. Jordan for cardiac care.    OPHTHALMOLOGIC:  He reports improvement in his eye condition with medication. He is now able to wear glasses and continues to use prescribed eye drops and Prednisone drops.    ALLERGIES:  He uses Allegra as needed, Flonase, and Singulair for allergy management.    UROLOGIC:  He follows up with Dr. Ortiz for prostate-related issues and takes Proscar Finistride and Tamsulosin.    EXERCISE:  He maintains a regular exercise routine, attending the gym three days per week.    MEDICATIONS:  Current medications include Albuterol, eye drops, Proscar Finistride, Flonase, Singulair, Pravastatin for cholesterol, Prednisone drops for eyes, Tamsulosin, Spiriva, and Valsartan. He denies using lidocaine patches.    SPECIALIST CARE:  He continues follow-up with Dr. Herman for pulmonology concerns, in addition to the aforementioned specialists.      ROS:  ROS as indicated in HPI.         Physical Exam    Vitals: Blood pressure: 78/80.  General: No acute distress. Well-developed. Well-nourished.  Eyes: EOMI. Sclerae anicteric.  HENT: Normocephalic. Atraumatic. Nares patent. Moist oral mucosa.  Cardiovascular: Regular rate. Regular rhythm. Heart murmur present. No rubs. No gallops. Normal S1, S2.  Respiratory: Normal respiratory effort. Clear  to auscultation bilaterally. No rales. No rhonchi. No wheezing.  Musculoskeletal: No  obvious deformity.  Extremities: No lower extremity edema.  Neurological: Alert & oriented x3. No slurred speech. Normal gait.  Psychiatric: Normal mood. Normal affect. Good insight. Good judgment.  Skin: Warm. Dry. No rash.         Assessment & Plan    IMPRESSION:  - Noted patient's vitamin B12 level at 314, considered low normal and potentially symptomatic  - Elevated bilirubin noted, plan to recheck  - Considered esophageal stricture as cause of patient's dysphagia, likely due to reflux  - Assessed vitamin D levels, previously prescribed supplementation  - Evaluated cardiac murmur, suspect aortic stenosis  - Will order echocardiogram to assess valve function, given longstanding murmur    VITAMIN B12 DEFICIENCY:  - Explained potential symptoms of low B12: fatigue, numbness, tingling, cognitive impairment.  - Started OTC B12 supplementation.  - Ordered CBC, CMP, vitamin B12, vitamin D levels.    GASTROESOPHAGEAL REFLUX DISEASE:  - Discussed esophageal stricture and its relation to reflux.  - Started reflux medication.    VITAMIN D DEFICIENCY:  - Restarted vitamin D supplementation, pending lab results.  - Educated on fat-soluble vitamins (A, D, E, K) and potential for toxicity.    HEART MURMUR:  - Explained heart murmurs and their relation to valve function.  - Ordered echocardiogram.    DYSPHAGIA:  - Referred to gastroenterology for evaluation of dysphagia, possibly with Dr. Cramer.    FOLLOW-UP:  - Follow up in 6 months.         Plan:          Esophageal dysphagia  -     Ambulatory referral/consult to Gastroenterology; Future; Expected date: 12/16/2024    Vitamin D deficiency  -     Vitamin D; Future; Expected date: 12/09/2024    B12 deficiency  -     Vitamin B12; Future; Expected date: 12/09/2024    Heart murmur  -     Echo; Future        Follow up in about 6 months (around 6/9/2025), or if symptoms worsen or fail to  improve.    This note was generated with the assistance of ambient listening technology. Verbal consent was obtained by the patient and accompanying visitor(s) for the recording of patient appointment to facilitate this note. I attest to having reviewed and edited the generated note for accuracy, though some syntax or spelling errors may persist. Please contact the author of this note for any clarification.

## 2024-12-09 NOTE — TELEPHONE ENCOUNTER
"Spoke with patient and informed him that his ECHO had been canceled due to it not being non urgent and he also had a financial liability for 450.00 for the ECHO, when told this patient stated, "good thing karie I dont have 450.00 for that right now." I verbalized understanding   "

## 2024-12-10 ENCOUNTER — TELEPHONE (OUTPATIENT)
Dept: PULMONOLOGY | Facility: CLINIC | Age: 79
End: 2024-12-10
Payer: MEDICARE

## 2024-12-10 DIAGNOSIS — E55.9 VITAMIN D DEFICIENCY: Primary | ICD-10-CM

## 2024-12-10 RX ORDER — MINERAL OIL
180 ENEMA (ML) RECTAL
Qty: 90 TABLET | Refills: 3 | Status: SHIPPED | OUTPATIENT
Start: 2024-12-10

## 2024-12-10 RX ORDER — ERGOCALCIFEROL 1.25 MG/1
50000 CAPSULE ORAL
Qty: 13 CAPSULE | Refills: 1 | Status: SHIPPED | OUTPATIENT
Start: 2024-12-10

## 2024-12-10 NOTE — TELEPHONE ENCOUNTER
No care due was identified.  [facility] Embedded Care Due Messages. Reference number: [id number].   12/10/2024 5:45:18 PM CST

## 2024-12-10 NOTE — TELEPHONE ENCOUNTER
Informed patient that per physician's last note he is to continue using the daily controller inhaler Spiriva.  Verbalized understanding.

## 2024-12-10 NOTE — TELEPHONE ENCOUNTER
----- Message from Constanza sent at 12/10/2024 11:35 AM CST -----  Contact: self  Type: Needs Medical Advice  Who Called:  the patient     Best Call Back Number: [phone]  Additional Information: pt called to see if Dr wanted him to continue to take   tiotropium bromide (SPIRIVA RESPIMAT) 2.5 mcg/actuation inhaler  Pt is still taking it but asking if he should refill this script

## 2024-12-11 RX ORDER — PREDNISONE 20 MG/1
20 TABLET ORAL
Qty: 5 TABLET | Refills: 0 | Status: SHIPPED | OUTPATIENT
Start: 2024-12-11

## 2024-12-11 NOTE — TELEPHONE ENCOUNTER
Refill Routing Note   Medication(s) are not appropriate for processing by [facility] for the following reason(s):        Outside of protocol    [facility] action(s):  Route  Approve      Medication Therapy Plan: PREDNISONE-OOP      Appointments  past 12m or future 3m with PCP    Date Provider   Last Visit   12/9/2024 Inna Martines MD   Next Visit   Visit date not found Inna Martines MD   ED visits in past 90 days: 0        Note composed:11:24 PM 12/10/2024

## 2024-12-16 ENCOUNTER — PATIENT MESSAGE (OUTPATIENT)
Dept: FAMILY MEDICINE | Facility: CLINIC | Age: 79
End: 2024-12-16
Payer: MEDICARE

## 2024-12-18 ENCOUNTER — OFFICE VISIT (OUTPATIENT)
Dept: UROLOGY | Facility: CLINIC | Age: 79
End: 2024-12-18
Payer: MEDICARE

## 2024-12-18 VITALS — BODY MASS INDEX: 24.39 KG/M2 | HEIGHT: 68 IN | WEIGHT: 160.94 LBS

## 2024-12-18 DIAGNOSIS — N35.816 OTHER STRICTURE OF OVERLAPPING SITES OF URETHRA IN MALE: ICD-10-CM

## 2024-12-18 DIAGNOSIS — R39.12 BENIGN PROSTATIC HYPERPLASIA WITH WEAK URINARY STREAM: Primary | ICD-10-CM

## 2024-12-18 DIAGNOSIS — N40.1 BENIGN PROSTATIC HYPERPLASIA WITH WEAK URINARY STREAM: Primary | ICD-10-CM

## 2024-12-18 LAB — POC RESIDUAL URINE VOLUME: 8 ML (ref 0–100)

## 2024-12-18 PROCEDURE — 3288F FALL RISK ASSESSMENT DOCD: CPT | Mod: CPTII,S$GLB,, | Performed by: UROLOGY

## 2024-12-18 PROCEDURE — 99999 PR PBB SHADOW E&M-EST. PATIENT-LVL II: CPT | Mod: PBBFAC,,, | Performed by: UROLOGY

## 2024-12-18 PROCEDURE — 99214 OFFICE O/P EST MOD 30 MIN: CPT | Mod: S$GLB,,, | Performed by: UROLOGY

## 2024-12-18 PROCEDURE — 1101F PT FALLS ASSESS-DOCD LE1/YR: CPT | Mod: CPTII,S$GLB,, | Performed by: UROLOGY

## 2024-12-18 PROCEDURE — 1126F AMNT PAIN NOTED NONE PRSNT: CPT | Mod: CPTII,S$GLB,, | Performed by: UROLOGY

## 2024-12-18 PROCEDURE — 1159F MED LIST DOCD IN RCRD: CPT | Mod: CPTII,S$GLB,, | Performed by: UROLOGY

## 2024-12-18 PROCEDURE — G2211 COMPLEX E/M VISIT ADD ON: HCPCS | Mod: S$GLB,,, | Performed by: UROLOGY

## 2024-12-18 PROCEDURE — 1160F RVW MEDS BY RX/DR IN RCRD: CPT | Mod: CPTII,S$GLB,, | Performed by: UROLOGY

## 2024-12-18 PROCEDURE — 51798 US URINE CAPACITY MEASURE: CPT | Mod: S$GLB,,, | Performed by: UROLOGY

## 2024-12-18 NOTE — PROGRESS NOTES
[facility] Established Patient/H&P:    Madi Renee is a 79 y.o. male who presents for follow up for lower urinary tract symptoms.     Patient with a history of HTN and LUTS who presents to establish care.      He reports a several year history of lower urinary tract symptoms - incomplete emptying, intermittency, weak stream, urgency and straining previously managed by Dr. Charles Vargas.      He is on Flomax 0.4 mg and Finasteride 5 mg. Only on Finasteride 3 times weekly. 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 Not significantly bothered by his symptoms.      Per patient, he had balanitis in the past that was refractory to topical therapies. He underwent circumcision with Dr. Vargas. Patient states that he believes the pathology resulted skin cancer, but no record available. He has not had any therapies thereafter.      Urine dipstick with trace leukocytes today.      Retired . [employer] . Smoked 1 ppd for 50 years - quit in approximately 2004.        Interval History     8/30/24: Offered cystoscopy and TRUS for his LUTS at initial visit, but declined. Also increased Flomax to 0.8 and instructed to continue Finasteride. Here for follow up. He presented to the ED on 8/16/24 with gross hematuria after a COVID diagnosis with associated dysuria. UA micro with >100 RBC and 5 WBC. No urine culture performed. Discharged home with Keflex, Norco and Zofran. Hematuria resolved after Abx. Urine dipstick negative today. Remains on Flomax 0.8 mg and Finasteride 5 mg PO daily with stable lower urinary tract symptoms.     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 Three small right intrarenal stones measuring 2 mm each without hydronephrosis. Cyst of the left kidney measures 3.5 cm. Diverticulosis coli without CT evidence of diverticulitis.  "Prostate hypertrophy. He underwent cystoscopy and TRUS on 9/25/24 with a 71 cc prostate with trilobar hypertrophy. Patient with mild meatal stenosis and two 12 [place] urethral strictures (penile and bulbar) that were traversed with the scope. Bladder with mild trabeculations. No diverticula. Here today for follow up. Symptoms have improved and IPSS is down to 11. PVR low at 8 mL.      Denies any fever, chills, flank pain, bone pain, unintentional weight loss,  trauma or history of  malignancy.         PSA*  2.0                   4/16/24  2.2                   2/22/23  2.6                   8/20/21  1.7                   11/12/18     A1C  5.4                   5/19/22     IPSS QoL  11 2 12/18/24  22 3          11/27/23     PVR  8 mL  12/18/24  14 mL              11/27/23     *Finasteride    Past Medical History:   Diagnosis Date    High cholesterol     Hypertension     Vertigo        Past Surgical History:   Procedure Laterality Date    COLON SURGERY      COLONOSCOPY  04/23/2024    done [facility]    CYSTOSCOPY N/A 9/25/2024    Procedure: CYSTOSCOPY;  Surgeon: Pam Ortiz Jr., MD;  Location: [facility] OR;  Service: Urology;  Laterality: N/A;    pallup removal      SHOULDER SURGERY Right     SPINAL FUSION      TRANSRECTAL ULTRASOUND EXAMINATION N/A 9/25/2024    Procedure: ULTRASOUND, RECTAL APPROACH;  Surgeon: Pam Ortiz Jr., MD;  [facility];  Service: Urology;  Laterality: N/A;       Review of patient's allergies indicates:   Allergen Reactions    Lisinopril Anaphylaxis     Other reaction(s): Cough  PT REQUEST REMOVAL OF THIS MEDICATION    Grass pollen-june grass standard Other (See Comments)       Medications Reviewed: see MAR    FOCUSED PHYSICAL EXAM:    There were no vitals filed for this visit.  Body mass index is 24.47 kg/m². Weight: 73 kg (160 lb 15 oz) Height: 5' 8" (172.7 cm)       General: Alert, cooperative, no distress, appears stated age  Abdomen: Soft, non-tender, no CVA " tenderness, non-distended      LABS:    Recent Results (from the past 2 weeks)   Vitamin D    Collection Time: 12/09/24  9:56 AM   Result Value Ref Range    Vit D, 25-Hydroxy 26 (L) 30 - 96 ng/mL   Vitamin B12    Collection Time: 12/09/24  9:56 AM   Result Value Ref Range    Vitamin B-12 223 210 - 950 pg/mL   POCT Bladder Scan    Collection Time: 12/18/24 11:05 AM   Result Value Ref Range    POC Residual Urine Volume 8 0 - 100 mL           Assessment/Diagnosis:    1. Benign prostatic hyperplasia with weak urinary stream  POCT Bladder Scan      2. 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 Visit today included increased complexity associated with the care of the episodic problem addressed and managing the longitudinal care of the patient due to the serious and/or complex managed problem(s) weak stream and meatal stenosis. Extensive discussion with patient regarding the etiology and management of his lower urinary tract symptoms. Explained that LUTS are multifactorial and can be secondary to an enlarged prostate, PO intake of bladder irritants, overactive bladder, constipation, malignancy, trauma, infection, stones or medications.   - Continue Flomax 0.4 mg PO daily.   - Continue Finasteride 5 mg PO daily.   - RTC in 6 months with symptom score and PVR.

## 2025-01-28 NOTE — PROGRESS NOTES
8/20/2024    Madi Renee  Patient Follow up     Chief Complaint   Patient presents with    Follow-up     3 month - still a little under the weather, a little shortness of breath       HPI:Mr Johnson is a 78 year old man who is a former smoking who presents with abnormal imaging. Hx of aortic stenosis    Started smoking at age 14, quit at age of 55, most he smoked was 1 pack per day.     Has been by PCP Dr Martines- due to persistent chronic cough he had some antibitoics and steroids- and it seemed to have helped- his symtpoms improved. During this evaluation he had a CXR which was abnormal and shouwled reticulonodular opacities and repeat CT confirmed there were some nodules particularly in the RUL and some underlying fibrosis. He also had some calcified lymph nodes.     Prev worked as a - at [employer]- did this for 20 years.   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 Lots of fumes and smoke when working as .     He has history of appendectomy- which he was told was malignant but was excised surgically.     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     He is losing weight- but this was intentional for weight loss  He is not coughing up blood.   He had a colonoscopy which showed some blood- [facility]   He has never had pneumonia in the past. Does not regularly require antibitoics or steroids.   He repotrs breathing is fine - he can walk 100 yards without stopping, goes to the gym- 30 minuets a day - walks on treadmill and bike.     He still has a persistent cough- he reporst he has sinus issues- he coughs the most during the day- intermittently its not constant- he doesn't cough up a a bunch of mucous in the morning. He does not cough after eating. He does not have trouble swallowing. He does have reflux. He does not taking medications.     Today:  He had COVID last week but he thinks  Discharge Instructions For Your Heart Catheterization/Stent with Radial/Wrist Site (SoftSeal Hemostasis Pad)    Activity: For the next 24 hours  Follow these guidelines related to the sedation medicine that you've received.   You must have someone drive you home.  You may feel tired, unsteady, or not quite yourself for the remainder of the day due to the sedation medicine. Your body gets rid of the medicine usually in 24 hours.  Do not drive or operate machinery or power tools.  Do not drink alcohol or smoke.  Do not make any important decisions or sign important papers.    Activity:   Follow these guidelines related to the puncture site in your wrist.  Minimal use of the arm used for the procedure for the remainder of the day. If possible, elevate your arm on a pillow and don't bend your wrist.  No lifting more than 5 pounds for 5 days with the arm used for the procedure.  If you do heavy physical work on your job, follow your doctor's instructions about when you may return to work.  Use ice pack for discomfort.  If you have a wrist immobilizer, remove the following morning.    Care of the radial procedure site with a SoftSeal Hemostasis Pad  Keep the dressing in place for 24 hours.  You may remove the clear dressing and gauze after 24 hours.   After 24 hours use water to remove  the SoftSeal pad (in shower or with wet clean wash cloth) and gently remove. If pad does not come off easily, apply more water.   Cleanse the site gently with soap and water, using a clean washcloth, then pat dry; Do not apply lotion, ointments or creams.  Apply band aid and change daily for 2 days.  No soaking the wrist in water (i.e., bathtub, hot tub, dish water, pool of water) until the wound has completely healed (3-5 days).    Common side effects you may notice  Soreness at puncture site.  Slight bruising at the site for several days to several weeks.  Lump the size of a pea or marble at the puncture site for a few  he is over that  Now his cough is resolved   He hasn't tried any of the recommendations we made for the post nasal drip- but he did start the singulair       The chief complaint problem is Stable    PFSH:  Past Medical History:   Diagnosis Date    High cholesterol     Hypertension     Vertigo          Past Surgical History:   Procedure Laterality Date    COLON SURGERY      COLONOSCOPY  2024    [facility]    pallup removal      SHOULDER SURGERY Right     SPINAL FUSION       Social History     Tobacco Use    Smoking status: Former     Current packs/day: 0.00     Average packs/day: 1 pack/day for 42.3 years (42.3 ttl pk-yrs)     Types: Cigarettes     Start date: 3/15/1964     Quit date: 2006     Years since quittin.1    Smokeless tobacco: Never   Substance Use Topics    Alcohol use: No    Drug use: No     Family History   Problem Relation Name Age of Onset    Cancer Mother Louriane Dedeaux     Hypertension Mother Jazz Renee     Vision loss Mother Jazz Oconnoreaux     Hypertension Father Crow Renee     Stroke Father Crow Renee      Review of patient's allergies indicates:   Allergen Reactions    Lisinopril Anaphylaxis     Other reaction(s): Cough  PT REQUEST REMOVAL OF THIS MEDICATION    Grass pollen-jorge grass standard Other (See Comments)       Performance Status:The patient's activity level is regular exercise.      Review of Systems:   Review of Systems   Constitutional:  Negative for chills, fever, malaise/fatigue and weight loss.   HENT:  Negative for congestion, sinus pain and sore throat.    Eyes:  Negative for blurred vision and pain.   Respiratory:  Positive for cough. Negative for shortness of breath and wheezing.    Cardiovascular:  Negative for chest pain, palpitations, orthopnea, claudication and leg swelling.   Gastrointestinal:  Negative for abdominal pain, constipation, diarrhea, heartburn, melena, nausea and vomiting.   Genitourinary:  Negative for dysuria,  weeks.      Diet/Fluids  Resume diet as prior to admission.  If you had a catheterization or stent, drink plenty of liquids to help flush the dye used for your procedure out of your body (unless you're on a fluid restriction ordered by your physician).      Medications  Take mild pain reliever such as Tylenol/Acetaminophen as needed for pain.    Call your doctor with these issues  Bleeding from the procedure site. If significant bleeding occurs or there is a growing lump at your site, apply firm pressure at the site where your dressing is. Call 911 and keep pressure on the site.  Numbness, tingling or color change in the arm used for puncture site.  Increasing pain and firmness near the puncture site.  A temperature greater than 101 degrees.  Redness or drainage around site.     frequency, hematuria and urgency.   Skin:  Negative for itching and rash.   Neurological:  Negative for dizziness, seizures, loss of consciousness and headaches.   Endo/Heme/Allergies:  Negative for environmental allergies and polydipsia. Does not bruise/bleed easily.   Psychiatric/Behavioral:  Negative for depression. The patient is not nervous/anxious.         Exam:  Physical Exam  Vitals reviewed.   Constitutional:       General: He is not in acute distress.     Appearance: He is well-developed. He is not diaphoretic.   HENT:      Head: Normocephalic and atraumatic.      Mouth/Throat:      Pharynx: No oropharyngeal exudate or posterior oropharyngeal erythema.   Eyes:      General: No scleral icterus.     Pupils: Pupils are equal, round, and reactive to light.   Neck:      Vascular: No JVD.   Cardiovascular:      Rate and Rhythm: Normal rate and regular rhythm.      Heart sounds: Normal heart sounds. No murmur heard.  Pulmonary:      Effort: Pulmonary effort is normal. No respiratory distress.      Breath sounds: Rales present. No wheezing.   Abdominal:      General: Bowel sounds are normal. There is no distension.      Palpations: Abdomen is soft.      Tenderness: There is no abdominal tenderness.   Musculoskeletal:         General: No swelling.      Cervical back: Normal range of motion and neck supple. No rigidity.   Skin:     General: Skin is warm and dry.      Capillary Refill: Capillary refill takes less than 2 seconds.      Coloration: Skin is not pale.      Findings: No rash.   Neurological:      General: No focal deficit present.      Mental Status: He is alert and oriented to person, place, and time.      Cranial Nerves: No cranial nerve deficit.      Motor: No weakness or abnormal muscle tone.          Radiographs (ct chest and cxr) reviewed: results reviewed               Labs none available   Lab Results   Component Value Date    WBC 4.29 08/16/2024    HGB 14.5 08/16/2024    HCT 42.8 08/16/2024    MCV  92 08/16/2024     08/16/2024       CMP  Sodium   Date Value Ref Range Status   08/16/2024 139 136 - 145 mmol/L Final     Potassium   Date Value Ref Range Status   08/16/2024 3.9 3.5 - 5.1 mmol/L Final     Chloride   Date Value Ref Range Status   08/16/2024 106 95 - 110 mmol/L Final     CO2   Date Value Ref Range Status   08/16/2024 24 23 - 29 mmol/L Final     Glucose   Date Value Ref Range Status   08/16/2024 81 70 - 110 mg/dL Final     BUN   Date Value Ref Range Status   08/16/2024 10 8 - 23 mg/dL Final     Creatinine   Date Value Ref Range Status   08/16/2024 0.9 0.5 - 1.4 mg/dL Final     Calcium   Date Value Ref Range Status   08/16/2024 8.7 8.7 - 10.5 mg/dL Final     Total Protein   Date Value Ref Range Status   04/16/2024 7.9 6.0 - 8.4 g/dL Final     Albumin   Date Value Ref Range Status   04/16/2024 3.7 3.5 - 5.2 g/dL Final     Total Bilirubin   Date Value Ref Range Status   04/16/2024 2.3 (H) 0.1 - 1.0 mg/dL Final     Comment:     For infants and newborns, interpretation of results should be based  on gestational age, weight and in agreement with clinical  observations.    Premature Infant recommended reference ranges:  Up to 24 hours.............<8.0 mg/dL  Up to 48 hours............<12.0 mg/dL  3-5 days..................<15.0 mg/dL  6-29 days.................<15.0 mg/dL       Alkaline Phosphatase   Date Value Ref Range Status   04/16/2024 85 55 - 135 U/L Final     AST   Date Value Ref Range Status   04/16/2024 18 10 - 40 U/L Final     ALT   Date Value Ref Range Status   04/16/2024 14 10 - 44 U/L Final     Anion Gap   Date Value Ref Range Status   08/16/2024 9 8 - 16 mmol/L Final     eGFR   Date Value Ref Range Status   08/16/2024 >60.0 >60 mL/min/1.73 m^2 Final         PFT was not done.       Plan:  Clinical impression is reasonably certain and repeated evaluation prn +/- follow up will be needed as below.    Mr Johnson is a 78 yaer old man who presents with abnormal imaging. He repotrs persistent  cough after a recent issue with respiratory exacerbation that was thought to be related to a bronchitis- he improved with antibiotics and steroids but still has persistent cough- I think this is due to reflux and possible some post nasal drip. This has now resolved. Regarding the abnormal CT- I think RUL nodular opacities represent scarring- PET scan shows no avidity. Will need to repeat another CT scan in about 6 months- if still stable then we can either continue yearly monitoring.      PFTs suggest mild COPD and obstruction with some component of restriciton.     Will start him on spiriva and prn albuterol  He is no longer having cough continue anithistamine, flonase, and singulair  Goes to the gym 3 days a week- I dont think pulm rehab is really needed right now and we discussed this at length h'ed rather go to gym independently   Follow up with me in November or December after CT chest     Problem List Items Addressed This Visit    None        No follow-ups on file.    Discussed with patient above for education the following:      There are no Patient Instructions on file for this visit.

## 2025-02-17 NOTE — TELEPHONE ENCOUNTER
INR good. Continue same dose. Recheck in 1 month   Spoke with pt. Scheduled appt on 8/30 @ 1:00. Pt verbalized understanding.

## 2025-03-21 ENCOUNTER — PATIENT MESSAGE (OUTPATIENT)
Dept: FAMILY MEDICINE | Facility: CLINIC | Age: 80
End: 2025-03-21
Payer: MEDICARE

## 2025-04-07 ENCOUNTER — OFFICE VISIT (OUTPATIENT)
Dept: FAMILY MEDICINE | Facility: CLINIC | Age: 80
End: 2025-04-07
Payer: MEDICARE

## 2025-04-07 VITALS
SYSTOLIC BLOOD PRESSURE: 130 MMHG | WEIGHT: 162 LBS | BODY MASS INDEX: 24.55 KG/M2 | HEART RATE: 92 BPM | OXYGEN SATURATION: 100 % | HEIGHT: 68 IN | DIASTOLIC BLOOD PRESSURE: 62 MMHG | RESPIRATION RATE: 18 BRPM

## 2025-04-07 DIAGNOSIS — L73.9 FOLLICULITIS: Primary | ICD-10-CM

## 2025-04-07 DIAGNOSIS — R91.1 SOLITARY PULMONARY NODULE: ICD-10-CM

## 2025-04-07 PROCEDURE — 99214 OFFICE O/P EST MOD 30 MIN: CPT | Mod: S$GLB,,, | Performed by: STUDENT IN AN ORGANIZED HEALTH CARE EDUCATION/TRAINING PROGRAM

## 2025-04-07 PROCEDURE — 99999 PR PBB SHADOW E&M-EST. PATIENT-LVL IV: CPT | Mod: PBBFAC,,, | Performed by: STUDENT IN AN ORGANIZED HEALTH CARE EDUCATION/TRAINING PROGRAM

## 2025-04-07 PROCEDURE — 1101F PT FALLS ASSESS-DOCD LE1/YR: CPT | Mod: CPTII,S$GLB,, | Performed by: STUDENT IN AN ORGANIZED HEALTH CARE EDUCATION/TRAINING PROGRAM

## 2025-04-07 PROCEDURE — 3075F SYST BP GE 130 - 139MM HG: CPT | Mod: CPTII,S$GLB,, | Performed by: STUDENT IN AN ORGANIZED HEALTH CARE EDUCATION/TRAINING PROGRAM

## 2025-04-07 PROCEDURE — 3288F FALL RISK ASSESSMENT DOCD: CPT | Mod: CPTII,S$GLB,, | Performed by: STUDENT IN AN ORGANIZED HEALTH CARE EDUCATION/TRAINING PROGRAM

## 2025-04-07 PROCEDURE — 1126F AMNT PAIN NOTED NONE PRSNT: CPT | Mod: CPTII,S$GLB,, | Performed by: STUDENT IN AN ORGANIZED HEALTH CARE EDUCATION/TRAINING PROGRAM

## 2025-04-07 PROCEDURE — 1159F MED LIST DOCD IN RCRD: CPT | Mod: CPTII,S$GLB,, | Performed by: STUDENT IN AN ORGANIZED HEALTH CARE EDUCATION/TRAINING PROGRAM

## 2025-04-07 PROCEDURE — 3078F DIAST BP <80 MM HG: CPT | Mod: CPTII,S$GLB,, | Performed by: STUDENT IN AN ORGANIZED HEALTH CARE EDUCATION/TRAINING PROGRAM

## 2025-04-07 RX ORDER — CEPHALEXIN 500 MG/1
500 CAPSULE ORAL 4 TIMES DAILY
Qty: 14 CAPSULE | Refills: 0 | Status: SHIPPED | OUTPATIENT
Start: 2025-04-07

## 2025-04-07 RX ORDER — MONTELUKAST SODIUM 10 MG/1
10 TABLET ORAL NIGHTLY
Qty: 90 TABLET | Refills: 2 | Status: SHIPPED | OUTPATIENT
Start: 2025-04-07

## 2025-04-07 RX ORDER — KETOCONAZOLE 20 MG/G
CREAM TOPICAL DAILY
Qty: 30 G | Refills: 0 | Status: SHIPPED | OUTPATIENT
Start: 2025-04-07

## 2025-04-07 RX ORDER — FLUCONAZOLE 150 MG/1
150 TABLET ORAL DAILY
Qty: 1 TABLET | Refills: 0 | Status: SHIPPED | OUTPATIENT
Start: 2025-04-07 | End: 2025-04-08

## 2025-04-07 NOTE — TELEPHONE ENCOUNTER
Care Due:                  Date            Visit Type   Department     Provider  --------------------------------------------------------------------------------                                EP -                              PRIMARY      [facility] 2ND FLOOR  Last Visit: 12-      CARE ([facility])   FAMILY Gustabo Martines                              EP -                              PRIMARY      [facility] 2ND FLOOR  Next Visit: 06-      CARE ([facility])   FAMILY Gustabo Martines                                                            Last  Test          Frequency    Reason                     Performed    Due Date  --------------------------------------------------------------------------------    CMP.........  12 months..  pravastatin..............  04- 04-    Lipid Panel.  12 months..  pravastatin..............  04- 04-    [facility] Embedded Care Due Messages. Reference number: [id number].   4/07/2025 10:39:44 AM CDT

## 2025-04-07 NOTE — TELEPHONE ENCOUNTER
Refill Routing Note   Medication(s) are not appropriate for processing by [facility] for the following reason(s):        No active prescription written by provider    [facility] action(s):  Defer   Requires labs : Yes - CMP & lipid panel            Appointments  past 12m or future 3m with PCP    Date Provider   Last Visit   12/9/2024 Inna Martines MD   Next Visit   6/9/2025 Inna Martines MD   ED visits in past 90 days: 0        Note composed:2:15 PM 04/07/2025

## 2025-04-07 NOTE — PROGRESS NOTES
SUBJECTIVE:    CHIEF COMPLAINT:   Chief Complaint   Patient presents with    Rash     Red rash on chest. Since 4/1/2025           274}    HISTORY OF PRESENT ILLNESS:  Madi Renee is a 79 y.o. male who presents to the clinic today   History of Present Illness    CHIEF COMPLAINT:  Mr. Renee presents today with a red rash    HISTORY OF PRESENT ILLNESS:  He reports a pruritic rash for approximately 1 week, located on throat, back, and thighs. The rash developed while performing yard work and is associated with frequent sweating. He denies pain. Multiple treatments including antibacterial soap, petroleum jelly, and body lotion have not provided improvement. Trial of Nystatin provided temporary relief. Powder application has been helpful.    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 He has a pulmonary nodule that is being monitored. He has a long-standing heart murmur of 60 years duration followed by cardiology. He underwent a GI scope at [facility] earlier this year and has history of appendectomy.      ROS:  General: -fever, -chills, -fatigue, -weight gain, -weight loss  Eyes: -vision changes, -redness, -discharge  ENT: -ear pain, -nasal congestion, +sore throat  Cardiovascular: -chest pain, -palpitations, -lower extremity edema  Respiratory: -cough, -shortness of breath  Gastrointestinal: -abdominal pain, -nausea, -vomiting, -diarrhea, -constipation, -blood in stool  Genitourinary: -dysuria, -hematuria, -frequency  Musculoskeletal: -joint pain, -muscle pain  Skin: +rash, -lesion, +itching, +excessive sweating  Neurological: -headache, -dizziness, -numbness, -tingling, +memory loss  Psychiatric: -anxiety, -depression, -sleep difficulty          PAST MEDICAL HISTORY:     274}  Past Medical History:   Diagnosis Date    High cholesterol     Hypertension     Vertigo        PAST SURGICAL HISTORY:  Past Surgical History:   Procedure Laterality  Date    COLON SURGERY      COLONOSCOPY  04/23/2024    done at [facility]    CYSTOSCOPY N/A 9/25/2024    Procedure: CYSTOSCOPY;  Surgeon: Pam Ortiz Jr., MD;  Location: [facility] OR;  Service: Urology;  Laterality: N/A;    pallup removal      SHOULDER SURGERY Right     SPINAL FUSION      TRANSRECTAL ULTRASOUND EXAMINATION N/A 9/25/2024    Procedure: ULTRASOUND, RECTAL APPROACH;  Surgeon: Pam Ortiz Jr., MD;  Location: [facility] OR;  Service: Urology;  Laterality: N/A;       SOCIAL HISTORY:  Social History[1]    FAMILY HISTORY:       Family History   Problem Relation Name Age of Onset    Cancer Mother Louriane Dedeaux     Hypertension Mother Louriane Dedeaux     Vision loss Mother Louriane Dedeaux     Hypertension Father [place] Dedeaux     Stroke Father [place] Dedeaux        ALLERGIES AND MEDICATIONS: updated and reviewed.      274}  Review of patient's allergies indicates:   Allergen Reactions    Lisinopril Anaphylaxis     Other reaction(s): Cough  PT REQUEST REMOVAL OF THIS MEDICATION    Grass pollen-june grass standard Other (See Comments)     Medication List with Changes/Refills   New Medications    CEPHALEXIN (KEFLEX) 500 MG CAPSULE    Take 1 capsule (500 mg total) by mouth 4 (four) times daily.    FLUCONAZOLE (DIFLUCAN) 150 MG TAB    Take 1 tablet (150 mg total) by mouth once daily. for 1 day    KETOCONAZOLE (NIZORAL) 2 % CREAM    Apply topically once daily.   Current Medications    ALBUTEROL (VENTOLIN HFA) 90 MCG/ACTUATION INHALER    Inhale 2 puffs into the lungs every 6 (six) hours as needed for Wheezing. Rescue    BRIMONIDINE 0.2% (ALPHAGAN) 0.2 % DROP    Place into both eyes.    DORZOLAMIDE-TIMOLOL 2-0.5% (COSOPT) 22.3-6.8 MG/ML OPHTHALMIC SOLUTION    Place into both eyes.    ERGOCALCIFEROL (ERGOCALCIFEROL) 50,000 UNIT CAP    Take 1 capsule (50,000 Units total) by mouth every 7 days.    FEXOFENADINE (ALLEGRA) 180 MG TABLET    TAKE 1 TABLET ONE TIME DAILY    FINASTERIDE (PROSCAR) 5 MG TABLET    Take 1  "tablet (5 mg total) by mouth once daily.    FLUTICASONE PROPIONATE (FLONASE) 50 MCG/ACTUATION NASAL SPRAY    2 sprays (100 mcg total) by Each Nostril route once daily.    MONTELUKAST (SINGULAIR) 10 MG TABLET    Take 10 mg by mouth.    PRAVASTATIN (PRAVACHOL) 40 MG TABLET    Take 1 tablet (40 mg total) by mouth once daily.    PREDNISOLONE ACETATE (PRED FORTE) 1 % DRPS    Place into both eyes.    PREDNISONE (DELTASONE) 20 MG TABLET    TAKE 1 TABLET ONE TIME DAILY    TAMSULOSIN (FLOMAX) 0.4 MG CAP    Take 2 capsules (0.8 mg total) by mouth once daily.    TIOTROPIUM BROMIDE (SPIRIVA RESPIMAT) 2.5 MCG/ACTUATION INHALER    Inhale 2 puffs into the lungs Daily. Controller    VALSARTAN (DIOVAN) 80 MG TABLET    Take 1 tablet (80 mg total) by mouth once daily.   Discontinued Medications    LIDOCAINE (LIDODERM) 5 %    Place 1 patch onto the skin once daily. Remove & Discard patch within 12 hours or as directed by MD       SCREENING HISTORY:    274}  Health Maintenance         Date Due Completion Date    TETANUS VACCINE 02/28/2026 2/29/2016    Lipid Panel 04/16/2029 4/16/2024            REVIEW OF SYSTEMS:   ROS    PHYSICAL EXAM:      274}  /62 (BP Location: Left arm, Patient Position: Sitting)   Pulse 92   Resp 18   Ht 5' 7.99" (1.727 m)   Wt 73.5 kg (162 lb)   SpO2 100%   BMI 24.64 kg/m²   Wt Readings from Last 3 Encounters:   04/07/25 73.5 kg (162 lb)   12/18/24 73 kg (160 lb 15 oz)   12/09/24 73.2 kg (161 lb 6.4 oz)     BP Readings from Last 3 Encounters:   04/07/25 130/62   12/09/24 (!) 102/58   09/25/24 (!) 165/67     Estimated body mass index is 24.64 kg/m² as calculated from the following:    Height as of this encounter: 5' 7.99" (1.727 m).    Weight as of this encounter: 73.5 kg (162 lb).     Physical Exam  HENT:      Head: Normocephalic and atraumatic.      Nose: Nose normal.      Mouth/Throat:      Mouth: Mucous membranes are dry.      Pharynx: Oropharynx is clear.   Eyes:      Extraocular Movements: " Extraocular movements intact.      Conjunctiva/sclera: Conjunctivae normal.   Cardiovascular:      Rate and Rhythm: Normal rate and regular rhythm.      Heart sounds: Murmur (systolic) heard.   Abdominal:      General: Bowel sounds are normal.      Palpations: Abdomen is soft.   Musculoskeletal:         General: Normal range of motion.      Cervical back: Normal range of motion.   Skin:     General: Skin is warm.      Comments: There is raised erythematous lesions in patches and satellite lesions in the chest    Neurological:      General: No focal deficit present.      Mental Status: He is alert. Mental status is at baseline.   Psychiatric:         Mood and Affect: Mood normal.         Thought Content: Thought content normal.         LABS:   274}  I have reviewed old labs below:  Lab Results   Component Value Date    WBC 4.29 08/16/2024    HGB 14.5 08/16/2024    HCT 42.8 08/16/2024    MCV 92 08/16/2024     08/16/2024     08/16/2024    K 3.9 08/16/2024     08/16/2024    CALCIUM 8.7 08/16/2024    CO2 24 08/16/2024    GLU 81 08/16/2024    BUN 10 08/16/2024    CREATININE 0.9 09/06/2024    ANIONGAP 9 08/16/2024    ESTGFRAFRICA >60.0 05/19/2022    EGFRNONAA >60.0 05/19/2022    PROT 7.9 04/16/2024    ALBUMIN 3.7 04/16/2024    BILITOT 2.3 (H) 04/16/2024    ALKPHOS 85 04/16/2024    ALT 14 04/16/2024    AST 18 04/16/2024    CHOL 142 04/16/2024    TRIG 77 04/16/2024    HDL 42 04/16/2024    LDLCALC 84.6 04/16/2024    TSH 1.484 04/16/2024    PSA 2.0 04/16/2024    HGBA1C 5.2 04/16/2024       ASSESSMENT AND PLAN:  274}  Assessment & Plan    IMPRESSION:  Suspect fungal and bacterial infection based on appearance of rash and partial response to nystatin.  Considered moisture from sweating during yard work as potential cause of rash.  Noted history of melanoma on foreskin, previous appendectomy, and spot on lung.    MALIGNANT NEOPLASM OF PENIS, UNSPECIFIED:  - Noted the patient's history of melanoma on the  foreskin, which was surgically removed.    MILD CHRONIC OBSTRUCTIVE PULMONARY DISEASE:  - Monitored the patient's lung spot, possibly on the left side, which was previously suspected to be cancerous but is not currently considered malignant.  - Mr. Renee has undergone multiple scans, including  CTs, to monitor this spot.  - Recommend scheduling another CT for ongoing monitoring and advised the patient to make an appointment with Dr. Herman to inquire about the CT results from the [facility].  - Continued the patient's current treatment with Spiriva for COPD management.    ATOPIC DERMATITIS (RASH):  - Evaluated patient's red, itchy rash on back and thighs, present for about a week.  - Suspected fungal/bacterial infection based on appearance.  - Explained that fungi and bacteria thrive in moist environments, potentially causing rashes when sweat is trapped on skin.  - Discussed potential causes including yard work, sweating, and moisture.  - Prescribed topical antifungal cream (stronger than nystatin) and oral medication for treatment.  - Advised patient to keep skin dry, especially when working outside, use a towel to wipe away sweat frequently, and wear breathable clothing materials.  - Instructed to contact the office if prescribed treatments are not effective.    SOLITARY PULMONARY NODULE:  - Ordered new CT of lungs to monitor the previously identified spot.    CARDIAC MURMUR:  - Noted patient's report of having a heart murmur for 60 years.  - Performed cardiac auscultation to evaluate the murmur.  - Confirmed that patient sees a cardiologist annually for check-ups.        1. Folliculitis  - ketoconazole (NIZORAL) 2 % cream; Apply topically once daily.  Dispense: 30 g; Refill: 0  - cephALEXin (KEFLEX) 500 MG capsule; Take 1 capsule (500 mg total) by mouth 4 (four) times daily.  Dispense: 14 capsule; Refill: 0  - fluconazole (DIFLUCAN) 150 MG Tab; Take 1 tablet (150 mg total) by mouth once daily. for 1 day   Dispense: 1 tablet; Refill: 0    2. Solitary pulmonary nodule  - CT Chest Without Contrast; Future         Orders Placed This Encounter   Procedures    CT Chest Without Contrast       No follow-ups on file. or sooner as needed.                 [1]   Social History  Socioeconomic History    Marital status:    Tobacco Use    Smoking status: Former     Current packs/day: 0.00     Average packs/day: 1 pack/day for 42.3 years (42.3 ttl pk-yrs)     Types: Cigarettes     Start date: 3/15/1964     Quit date: 2006     Years since quittin.7    Smokeless tobacco: Never   Substance and Sexual Activity    Alcohol use: No    Drug use: No    Sexual activity: Not Currently     Partners: Female     Social Drivers of Health     Financial Resource Strain: Low Risk  (2024)    Overall Financial Resource Strain (CARDIA)     Difficulty of Paying Living Expenses: Not hard at all   Food Insecurity: No Food Insecurity (2024)    Hunger Vital Sign     Worried About Running Out of Food in the Last Year: Never true     Ran Out of Food in the Last Year: Never true   Transportation Needs: No Transportation Needs (2024)    PRAPARE - Transportation     Lack of Transportation (Medical): No     Lack of Transportation (Non-Medical): No   Physical Activity: Sufficiently Active (2024)    Exercise Vital Sign     Days of Exercise per Week: 3 days     Minutes of Exercise per Session: 60 min   Stress: No Stress Concern Present (2024)    [place] [employer] - Occupational Stress Questionnaire     Feeling of Stress : Not at all   Housing Stability: Unknown (2024)    Housing Stability Vital Sign     Unable to Pay for Housing in the Last Year: No

## 2025-04-14 DIAGNOSIS — L73.9 FOLLICULITIS: ICD-10-CM

## 2025-04-14 RX ORDER — CEPHALEXIN 500 MG/1
500 CAPSULE ORAL 4 TIMES DAILY
Qty: 14 CAPSULE | Refills: 0 | Status: SHIPPED | OUTPATIENT
Start: 2025-04-14

## 2025-04-14 RX ORDER — FLUCONAZOLE 150 MG/1
150 TABLET ORAL DAILY
Qty: 1 TABLET | Refills: 0 | Status: SHIPPED | OUTPATIENT
Start: 2025-04-14 | End: 2025-04-17

## 2025-04-14 RX ORDER — KETOCONAZOLE 20 MG/G
CREAM TOPICAL DAILY
Qty: 30 G | Refills: 0 | Status: SHIPPED | OUTPATIENT
Start: 2025-04-14

## 2025-04-14 NOTE — TELEPHONE ENCOUNTER
----- Message from Madeline sent at 4/14/2025  4:49 PM CDT -----  Type:  RX Refill RequestWho Called: pt Refill or New Rx:refill RX Name and Strength:ketoconazole (NIZORAL) 2 % cream, cephALEXin (KEFLEX) 500 MG capsule and  flucunazoleHow is the patient currently taking it? (ex. 1XDay):pt out Is this a 30 day or 90 day RX:unsure Preferred Pharmacy with phone number:  [address] [place] - [address] [address]Jtmji: [phone] Fax: [phone] or Mail Order:local Ordering Provider:Kobi  Would the patient rather a call back or a response via [facility]? Please call Best Call Back Number:[phone] Additional Information: pt is out of meds, and pt still had a few bumps and redness but feels he may benefit from another prescription    Please call back to advise. Thanks!

## 2025-04-17 ENCOUNTER — OFFICE VISIT (OUTPATIENT)
Dept: FAMILY MEDICINE | Facility: CLINIC | Age: 80
End: 2025-04-17
Payer: MEDICARE

## 2025-04-17 VITALS
SYSTOLIC BLOOD PRESSURE: 130 MMHG | OXYGEN SATURATION: 100 % | WEIGHT: 163.38 LBS | HEIGHT: 68 IN | HEART RATE: 88 BPM | RESPIRATION RATE: 16 BRPM | DIASTOLIC BLOOD PRESSURE: 68 MMHG | BODY MASS INDEX: 24.76 KG/M2

## 2025-04-17 DIAGNOSIS — L30.9 ECZEMA, UNSPECIFIED TYPE: Primary | ICD-10-CM

## 2025-04-17 DIAGNOSIS — D22.9 NEVUS: ICD-10-CM

## 2025-04-17 PROCEDURE — 99214 OFFICE O/P EST MOD 30 MIN: CPT | Mod: S$GLB,,, | Performed by: STUDENT IN AN ORGANIZED HEALTH CARE EDUCATION/TRAINING PROGRAM

## 2025-04-17 PROCEDURE — 3078F DIAST BP <80 MM HG: CPT | Mod: CPTII,S$GLB,, | Performed by: STUDENT IN AN ORGANIZED HEALTH CARE EDUCATION/TRAINING PROGRAM

## 2025-04-17 PROCEDURE — 1159F MED LIST DOCD IN RCRD: CPT | Mod: CPTII,S$GLB,, | Performed by: STUDENT IN AN ORGANIZED HEALTH CARE EDUCATION/TRAINING PROGRAM

## 2025-04-17 PROCEDURE — 3288F FALL RISK ASSESSMENT DOCD: CPT | Mod: CPTII,S$GLB,, | Performed by: STUDENT IN AN ORGANIZED HEALTH CARE EDUCATION/TRAINING PROGRAM

## 2025-04-17 PROCEDURE — 1101F PT FALLS ASSESS-DOCD LE1/YR: CPT | Mod: CPTII,S$GLB,, | Performed by: STUDENT IN AN ORGANIZED HEALTH CARE EDUCATION/TRAINING PROGRAM

## 2025-04-17 PROCEDURE — 1126F AMNT PAIN NOTED NONE PRSNT: CPT | Mod: CPTII,S$GLB,, | Performed by: STUDENT IN AN ORGANIZED HEALTH CARE EDUCATION/TRAINING PROGRAM

## 2025-04-17 PROCEDURE — 3075F SYST BP GE 130 - 139MM HG: CPT | Mod: CPTII,S$GLB,, | Performed by: STUDENT IN AN ORGANIZED HEALTH CARE EDUCATION/TRAINING PROGRAM

## 2025-04-17 PROCEDURE — 99999 PR PBB SHADOW E&M-EST. PATIENT-LVL IV: CPT | Mod: PBBFAC,,, | Performed by: STUDENT IN AN ORGANIZED HEALTH CARE EDUCATION/TRAINING PROGRAM

## 2025-04-17 NOTE — PROGRESS NOTES
SUBJECTIVE:    CHIEF COMPLAINT:   Chief Complaint   Patient presents with    Follow-up     Following up from rash on chest and back. Pt claims it seems to be getting better.           274}    HISTORY OF PRESENT ILLNESS:  Madi Renee is a 79 y.o. male who presents to the clinic today   History of Present Illness    CHIEF COMPLAINT:  Mr. Renee presents today for evaluation of a rash with itching    SKIN CONDITION:  He reports daily dry skin with significant itching, particularly on the face and sometimes on the side of the body. He denies prior history of similar skin condition. The condition appears to be improving. He currently uses a powder obtained by his wife and applies Vaseline Intensive Care lotion on his face daily after showering. A previously prescribed cream was effective for itching but he has run out. Patient treated w/ oral fungal and abx previously with topical antifungal. Symptoms are improving    ROS:  General: -fever, -chills, -fatigue, -weight gain, -weight loss  Eyes: -vision changes, -redness, -discharge  ENT: -ear pain, -nasal congestion, -sore throat  Cardiovascular: -chest pain, -palpitations, -lower extremity edema  Respiratory: -cough, -shortness of breath  Gastrointestinal: -abdominal pain, -nausea, -vomiting, -diarrhea, -constipation, -blood in stool  Genitourinary: -dysuria, -hematuria, -frequency  Musculoskeletal: -joint pain, -muscle pain  Skin: -rash, +lesion, +itching, +skin redness, +lumps/masses, +dry skin  Neurological: -headache, -dizziness, -numbness, -tingling  Psychiatric: +anxiety, -depression, -sleep difficulty, +panic attacks          PAST MEDICAL HISTORY:     274}  Past Medical History:   Diagnosis Date    High cholesterol     Hypertension     Vertigo        PAST SURGICAL HISTORY:  Past Surgical History:   Procedure Laterality Date    COLON SURGERY      COLONOSCOPY  04/23/2024    done at [facility]    CYSTOSCOPY N/A 9/25/2024    Procedure: CYSTOSCOPY;  Surgeon: Angel  Pam EASLEY Jr., MD;  Location: [facility] OR;  Service: Urology;  Laterality: N/A;    pallup removal      SHOULDER SURGERY Right     SPINAL FUSION      TRANSRECTAL ULTRASOUND EXAMINATION N/A 9/25/2024    Procedure: ULTRASOUND, RECTAL APPROACH;  Surgeon: Pam Ortiz Jr., MD;  Location: [facility] OR;  Service: Urology;  Laterality: N/A;       SOCIAL HISTORY:  Social History[1]    FAMILY HISTORY:       Family History   Problem Relation Name Age of Onset    Cancer Mother Louriane Dedeaux     Hypertension Mother Louriane Dedeaux     Vision loss Mother Louriane Dedeaux     Hypertension Father [place] Dedeaux     Stroke Father [place] Dedeaux        ALLERGIES AND MEDICATIONS: updated and reviewed.      274}  Review of patient's allergies indicates:   Allergen Reactions    Lisinopril Anaphylaxis     Other reaction(s): Cough  PT REQUEST REMOVAL OF THIS MEDICATION    Grass pollen-june grass standard Other (See Comments)     Medication List with Changes/Refills   Current Medications    ALBUTEROL (VENTOLIN HFA) 90 MCG/ACTUATION INHALER    Inhale 2 puffs into the lungs every 6 (six) hours as needed for Wheezing. Rescue    BRIMONIDINE 0.2% (ALPHAGAN) 0.2 % DROP    Place into both eyes.    CEPHALEXIN (KEFLEX) 500 MG CAPSULE    Take 1 capsule (500 mg total) by mouth 4 (four) times daily.    DORZOLAMIDE-TIMOLOL 2-0.5% (COSOPT) 22.3-6.8 MG/ML OPHTHALMIC SOLUTION    Place into both eyes.    ERGOCALCIFEROL (ERGOCALCIFEROL) 50,000 UNIT CAP    Take 1 capsule (50,000 Units total) by mouth every 7 days.    FEXOFENADINE (ALLEGRA) 180 MG TABLET    TAKE 1 TABLET ONE TIME DAILY    FINASTERIDE (PROSCAR) 5 MG TABLET    Take 1 tablet (5 mg total) by mouth once daily.    FLUTICASONE PROPIONATE (FLONASE) 50 MCG/ACTUATION NASAL SPRAY    2 sprays (100 mcg total) by Each Nostril route once daily.    KETOCONAZOLE (NIZORAL) 2 % CREAM    Apply topically once daily.    MONTELUKAST (SINGULAIR) 10 MG TABLET    TAKE 1 TABLET EVERY EVENING    PRAVASTATIN  "(PRAVACHOL) 40 MG TABLET    Take 1 tablet (40 mg total) by mouth once daily.    PREDNISOLONE ACETATE (PRED FORTE) 1 % DRPS    Place into both eyes.    PREDNISONE (DELTASONE) 20 MG TABLET    TAKE 1 TABLET ONE TIME DAILY    TAMSULOSIN (FLOMAX) 0.4 MG CAP    Take 2 capsules (0.8 mg total) by mouth once daily.    TIOTROPIUM BROMIDE (SPIRIVA RESPIMAT) 2.5 MCG/ACTUATION INHALER    Inhale 2 puffs into the lungs Daily. Controller    VALSARTAN (DIOVAN) 80 MG TABLET    Take 1 tablet (80 mg total) by mouth once daily.   Discontinued Medications    FLUCONAZOLE (DIFLUCAN) 150 MG TAB    Take 1 tablet (150 mg total) by mouth once daily. for 1 day       SCREENING HISTORY:    274}  Health Maintenance         Date Due Completion Date    TETANUS VACCINE 02/28/2026 2/29/2016    Lipid Panel 04/16/2029 4/16/2024            REVIEW OF SYSTEMS:   ROS    PHYSICAL EXAM:      274}  /68 (BP Location: Left arm, Patient Position: Sitting)   Pulse 88   Resp 16   Ht 5' 7.99" (1.727 m)   Wt 74.1 kg (163 lb 6.4 oz)   SpO2 100%   BMI 24.85 kg/m²   Wt Readings from Last 3 Encounters:   04/17/25 74.1 kg (163 lb 6.4 oz)   04/07/25 73.5 kg (162 lb)   12/18/24 73 kg (160 lb 15 oz)     BP Readings from Last 3 Encounters:   04/17/25 130/68   04/07/25 130/62   12/09/24 (!) 102/58     Estimated body mass index is 24.85 kg/m² as calculated from the following:    Height as of this encounter: 5' 7.99" (1.727 m).    Weight as of this encounter: 74.1 kg (163 lb 6.4 oz).     Physical Exam  HENT:      Head: Normocephalic and atraumatic.      Nose: Nose normal.      Mouth/Throat:      Mouth: Mucous membranes are dry.      Pharynx: Oropharynx is clear.   Eyes:      Extraocular Movements: Extraocular movements intact.      Conjunctiva/sclera: Conjunctivae normal.   Cardiovascular:      Rate and Rhythm: Normal rate and regular rhythm.   Pulmonary:      Effort: Pulmonary effort is normal.      Breath sounds: Normal breath sounds.   Abdominal:      General: " Bowel sounds are normal.      Palpations: Abdomen is soft.   Musculoskeletal:         General: Normal range of motion.      Cervical back: Normal range of motion.   Skin:     Comments: Hyperpigmentation w/ scaling patches on the chest, there is another area of with different coloration in the anterior chest wall   Neurological:      General: No focal deficit present.      Mental Status: He is alert. Mental status is at baseline.   Psychiatric:         Mood and Affect: Mood normal.         Thought Content: Thought content normal.         LABS:   274}  I have reviewed old labs below:  Lab Results   Component Value Date    WBC 4.29 08/16/2024    HGB 14.5 08/16/2024    HCT 42.8 08/16/2024    MCV 92 08/16/2024     08/16/2024     08/16/2024    K 3.9 08/16/2024     08/16/2024    CALCIUM 8.7 08/16/2024    CO2 24 08/16/2024    GLU 81 08/16/2024    BUN 10 08/16/2024    CREATININE 0.9 09/06/2024    ANIONGAP 9 08/16/2024    ESTGFRAFRICA >60.0 05/19/2022    EGFRNONAA >60.0 05/19/2022    PROT 7.9 04/16/2024    ALBUMIN 3.7 04/16/2024    BILITOT 2.3 (H) 04/16/2024    ALKPHOS 85 04/16/2024    ALT 14 04/16/2024    AST 18 04/16/2024    CHOL 142 04/16/2024    TRIG 77 04/16/2024    HDL 42 04/16/2024    LDLCALC 84.6 04/16/2024    TSH 1.484 04/16/2024    PSA 2.0 04/16/2024    HGBA1C 5.2 04/16/2024       ASSESSMENT AND PLAN:  274}  Assessment & Plan    IMPRESSION:  Assessed skin condition, noting improvement in redness and clearing of bumps.  Suspect eczema based on symptoms and history of dry skin.  Determined current treatment with moisturizer is effective, no additional medication needed at this time.    DERMATITIS (POSSIBLY ECZEMA):  - Mr. Renee reports improved itch and redness, including on the side.  - Observed that the condition is clearing up with reduced redness.  - Assessed the condition as possibly eczema.    Nevus:   Discuss with patient d/t discoloration of the hyperpigmentation in the central rash on his  chest. Will place a referral to the dermatology. The lesion changes in size.    DRY SKIN (XEROSIS CUTIS):  - Educated patient about the importance of consistent moisturizer use for managing dry skin and preventing eczema flare-ups.  - Instructed to use generous amounts of Vaseline Intensive Care lotion or similar moisturizer daily, especially after showering and to affected areas and face.  - Advised to continue monitoring skin condition for any changes or lack of improvement.    PRURITUS (ITCHING):  - Noted that the itch is improving with treatment.  - Recommend continuing use of moisturizer as instructed.    FOLLOW-UP:  - Recommend contacting the office if skin condition does not clear up, for potential referral to a dermatologist.        1. Eczema, unspecified type     No orders of the defined types were placed in this encounter.      No follow-ups on file. or sooner as needed.                 [1]   Social History  Socioeconomic History    Marital status:    Tobacco Use    Smoking status: Former     Current packs/day: 0.00     Average packs/day: 1 pack/day for 42.3 years (42.3 ttl pk-yrs)     Types: Cigarettes     Start date: 3/15/1964     Quit date: 2006     Years since quittin.7    Smokeless tobacco: Never   Substance and Sexual Activity    Alcohol use: No    Drug use: No    Sexual activity: Not Currently     Partners: Female     Social Drivers of Health     Financial Resource Strain: Low Risk  (2024)    Overall Financial Resource Strain (CARDIA)     Difficulty of Paying Living Expenses: Not hard at all   Food Insecurity: No Food Insecurity (2024)    Hunger Vital Sign     Worried About Running Out of Food in the Last Year: Never true     Ran Out of Food in the Last Year: Never true   Transportation Needs: No Transportation Needs (2024)    PRAPARE - Transportation     Lack of Transportation (Medical): No     Lack of Transportation (Non-Medical): No   Physical Activity:  Sufficiently Active (4/16/2024)    Exercise Vital Sign     Days of Exercise per Week: 3 days     Minutes of Exercise per Session: 60 min   Stress: No Stress Concern Present (4/16/2024)    [place] [employer] - Occupational Stress Questionnaire     Feeling of Stress : Not at all   Housing Stability: Unknown (4/16/2024)    Housing Stability Vital Sign     Unable to Pay for Housing in the Last Year: No

## 2025-04-23 DIAGNOSIS — I10 ESSENTIAL HYPERTENSION: ICD-10-CM

## 2025-04-23 DIAGNOSIS — E78.5 HYPERLIPIDEMIA, UNSPECIFIED HYPERLIPIDEMIA TYPE: ICD-10-CM

## 2025-04-23 RX ORDER — VALSARTAN 80 MG/1
80 TABLET ORAL
Qty: 90 TABLET | Refills: 1 | Status: SHIPPED | OUTPATIENT
Start: 2025-04-23

## 2025-04-23 RX ORDER — PRAVASTATIN SODIUM 40 MG/1
40 TABLET ORAL NIGHTLY
Qty: 30 TABLET | Refills: 0 | Status: SHIPPED | OUTPATIENT
Start: 2025-04-23

## 2025-04-23 NOTE — TELEPHONE ENCOUNTER
No care due was identified.  [facility] Embedded Care Due Messages. Reference number: [id number].   4/23/2025 4:50:47 AM CDT

## 2025-04-23 NOTE — TELEPHONE ENCOUNTER
Refill Routing Note   Medication(s) are not appropriate for processing by [facility] for the following reason(s):        Required labs outdated    [facility] action(s):  Defer  Approve               Appointments  past 12m or future 3m with PCP    Date Provider   Last Visit   12/9/2024 Inna Martines MD   Next Visit   6/9/2025 Inna Martines MD   ED visits in past 90 days: 0        Note composed:8:51 AM 04/23/2025

## 2025-04-29 ENCOUNTER — PATIENT MESSAGE (OUTPATIENT)
Dept: FAMILY MEDICINE | Facility: CLINIC | Age: 80
End: 2025-04-29
Payer: MEDICARE

## 2025-05-01 ENCOUNTER — OFFICE VISIT (OUTPATIENT)
Dept: PODIATRY | Facility: CLINIC | Age: 80
End: 2025-05-01
Payer: MEDICARE

## 2025-05-01 VITALS — HEIGHT: 67 IN | BODY MASS INDEX: 25.46 KG/M2 | WEIGHT: 162.19 LBS

## 2025-05-01 DIAGNOSIS — R26.2 DIFFICULTY WALKING: ICD-10-CM

## 2025-05-01 DIAGNOSIS — L60.0 INGROWN NAIL: Primary | ICD-10-CM

## 2025-05-01 DIAGNOSIS — L60.8 PINCER NAIL DEFORMITY: ICD-10-CM

## 2025-05-01 DIAGNOSIS — M79.675 PAIN OF TOE OF LEFT FOOT: ICD-10-CM

## 2025-05-01 PROCEDURE — 1159F MED LIST DOCD IN RCRD: CPT | Mod: CPTII,S$GLB,, | Performed by: PODIATRIST

## 2025-05-01 PROCEDURE — 1160F RVW MEDS BY RX/DR IN RCRD: CPT | Mod: CPTII,S$GLB,, | Performed by: PODIATRIST

## 2025-05-01 PROCEDURE — 99213 OFFICE O/P EST LOW 20 MIN: CPT | Mod: S$GLB,,, | Performed by: PODIATRIST

## 2025-05-01 PROCEDURE — 1125F AMNT PAIN NOTED PAIN PRSNT: CPT | Mod: CPTII,S$GLB,, | Performed by: PODIATRIST

## 2025-05-01 RX ORDER — OMEPRAZOLE 40 MG/1
40 CAPSULE, DELAYED RELEASE ORAL
COMMUNITY
Start: 2025-01-20

## 2025-05-15 DIAGNOSIS — E78.5 HYPERLIPIDEMIA, UNSPECIFIED HYPERLIPIDEMIA TYPE: ICD-10-CM

## 2025-05-15 RX ORDER — PRAVASTATIN SODIUM 40 MG/1
40 TABLET ORAL NIGHTLY
Qty: 30 TABLET | Refills: 11 | Status: SHIPPED | OUTPATIENT
Start: 2025-05-15

## 2025-05-19 NOTE — PROGRESS NOTES
Subjective:     Patient ID: Madi Renee is a 79 y.o. male    Chief Complaint: Toe Pain       Madi is a 79 y.o. male who presents to the clinic complaining of painful ingrown toenail on the left foot.  Patient reports difficulty trimming tip of the left 1st digit.  Patient states he believes nail has curved in his trying to ingrown in his skin.  Patient currently rates pain 1/10.  Patient denies any recent trauma.  Patient also denies any recent drainage from the area    Review of Systems   Constitutional: Negative.  Negative for chills and fever.   Respiratory:  Negative for cough and shortness of breath.    Cardiovascular:  Negative for chest pain and leg swelling.   Gastrointestinal:  Negative for diarrhea, nausea and vomiting.   Neurological:  Negative for tingling.        Objective:   Physical Exam  Vitals reviewed.   Constitutional:       General: He is not in acute distress.     Appearance: Normal appearance. He is not ill-appearing.   HENT:      Head: Normocephalic.      Nose: Nose normal.   Cardiovascular:      Pulses:           Dorsalis pedis pulses are 2+ on the right side and 2+ on the left side.        Posterior tibial pulses are 1+ on the right side and 1+ on the left side.   Pulmonary:      Effort: Pulmonary effort is normal. No respiratory distress.   Skin:     Capillary Refill: Capillary refill takes 2 to 3 seconds.   Neurological:      Mental Status: He is alert and oriented to person, place, and time.   Psychiatric:         Mood and Affect: Mood normal.         Behavior: Behavior normal.         Thought Content: Thought content normal.        Foot Exam    General  General Appearance: appears stated age and healthy   Orientation: alert and oriented to person, place, and time   Affect: appropriate   Gait: unimpaired       Right Foot/Ankle     Neurovascular  Dorsalis pedis: 2+  Posterior tibial: 1+      Left Foot/Ankle      Inspection and Palpation  Tenderness: (Left 1st digit medial  nail)    Neurovascular  Dorsalis pedis: 2+  Posterior tibial: 1+           Assessment:         1. Ingrown nail    2. Pincer nail deformity    3. Pain of toe of left foot    4. Difficulty walking       Plan:     Madi Renee was seen today for   Chief Complaint   Patient presents with    Toe Pain       Assessment & Plan           Procedures     Utilizing sterile toenail clippers I aggressively debrided the offending nail border approximately 3 mm from its edge and carried the nail plate incision down at an angle in order to wedge out the offending cryptotic portion of the nail plate. The offending border was then removed in toto. The area was cleansed with alcohol. Patient tolerated the procedure well and related significant relief.  1. Patient was examined and evaluated.    2. Patient made aware that he does have abnormal growing shape of left 1st digit medial nail border.  Patient made aware of pincer nail type.  Patient made aware that he does not seem to have a aggressive ingrown toenail today.  Discussed with patient potential ingrown toenail procedures partial permanent versus partial nonpermanent should symptoms worsen after current slant back.  Patient had triple antibiotic ointment and a dry sterile bandage applied to the left 1st digit.  Patient was advised to adjunct with OTC analgesics/NSAIDs for pain relief if necessary.  Patient will continue with application of the bandage of the area for protection against distal end of the shoe gear.  3. Patient will follow-up in 1 month or p.r.n. for complaints      Francis Harrison DPM  [id number] [id number] [phone]      This note was created using [employer] direct voice recognition software. Note may have occasional typographical errors that may not have been identified and edited despite initial review prior to signing.

## 2025-06-02 ENCOUNTER — OFFICE VISIT (OUTPATIENT)
Dept: PODIATRY | Facility: CLINIC | Age: 80
End: 2025-06-02
Payer: MEDICARE

## 2025-06-02 VITALS — BODY MASS INDEX: 24.48 KG/M2 | WEIGHT: 156 LBS | HEIGHT: 67 IN

## 2025-06-02 DIAGNOSIS — L60.0 INGROWN NAIL: ICD-10-CM

## 2025-06-02 DIAGNOSIS — L60.8 PINCER NAIL DEFORMITY: Primary | ICD-10-CM

## 2025-06-02 DIAGNOSIS — M79.675 PAIN OF TOE OF LEFT FOOT: ICD-10-CM

## 2025-06-02 DIAGNOSIS — R26.2 DIFFICULTY WALKING: ICD-10-CM

## 2025-06-09 ENCOUNTER — RESULTS FOLLOW-UP (OUTPATIENT)
Dept: FAMILY MEDICINE | Facility: CLINIC | Age: 80
End: 2025-06-09

## 2025-06-09 ENCOUNTER — OFFICE VISIT (OUTPATIENT)
Dept: FAMILY MEDICINE | Facility: CLINIC | Age: 80
End: 2025-06-09
Payer: MEDICARE

## 2025-06-09 ENCOUNTER — APPOINTMENT (OUTPATIENT)
Dept: LAB | Facility: HOSPITAL | Age: 80
End: 2025-06-09
Attending: FAMILY MEDICINE
Payer: MEDICARE

## 2025-06-09 VITALS
OXYGEN SATURATION: 98 % | WEIGHT: 161.38 LBS | HEART RATE: 63 BPM | BODY MASS INDEX: 25.33 KG/M2 | HEIGHT: 67 IN | SYSTOLIC BLOOD PRESSURE: 122 MMHG | DIASTOLIC BLOOD PRESSURE: 72 MMHG

## 2025-06-09 DIAGNOSIS — Z85.9 H/O MALIGNANT NEOPLASM: ICD-10-CM

## 2025-06-09 DIAGNOSIS — J44.9 MILD CHRONIC OBSTRUCTIVE PULMONARY DISEASE: ICD-10-CM

## 2025-06-09 DIAGNOSIS — Z00.00 ANNUAL PHYSICAL EXAM: ICD-10-CM

## 2025-06-09 DIAGNOSIS — E78.5 HYPERLIPIDEMIA, UNSPECIFIED HYPERLIPIDEMIA TYPE: Primary | ICD-10-CM

## 2025-06-09 DIAGNOSIS — I10 ESSENTIAL HYPERTENSION: ICD-10-CM

## 2025-06-09 DIAGNOSIS — R91.1 SOLITARY PULMONARY NODULE: ICD-10-CM

## 2025-06-09 DIAGNOSIS — E55.9 VITAMIN D DEFICIENCY: ICD-10-CM

## 2025-06-09 DIAGNOSIS — Z12.5 ENCOUNTER FOR SCREENING FOR MALIGNANT NEOPLASM OF PROSTATE: ICD-10-CM

## 2025-06-09 DIAGNOSIS — Z13.1 DIABETES MELLITUS SCREENING: ICD-10-CM

## 2025-06-09 DIAGNOSIS — E53.8 B12 DEFICIENCY: ICD-10-CM

## 2025-06-09 PROCEDURE — G2211 COMPLEX E/M VISIT ADD ON: HCPCS | Mod: S$GLB,,, | Performed by: FAMILY MEDICINE

## 2025-06-09 PROCEDURE — 3074F SYST BP LT 130 MM HG: CPT | Mod: CPTII,S$GLB,, | Performed by: FAMILY MEDICINE

## 2025-06-09 PROCEDURE — 3078F DIAST BP <80 MM HG: CPT | Mod: CPTII,S$GLB,, | Performed by: FAMILY MEDICINE

## 2025-06-09 PROCEDURE — 99214 OFFICE O/P EST MOD 30 MIN: CPT | Mod: S$GLB,,, | Performed by: FAMILY MEDICINE

## 2025-06-09 PROCEDURE — 1126F AMNT PAIN NOTED NONE PRSNT: CPT | Mod: CPTII,S$GLB,, | Performed by: FAMILY MEDICINE

## 2025-06-09 PROCEDURE — 3288F FALL RISK ASSESSMENT DOCD: CPT | Mod: CPTII,S$GLB,, | Performed by: FAMILY MEDICINE

## 2025-06-09 PROCEDURE — 1101F PT FALLS ASSESS-DOCD LE1/YR: CPT | Mod: CPTII,S$GLB,, | Performed by: FAMILY MEDICINE

## 2025-06-09 PROCEDURE — 99999 PR PBB SHADOW E&M-EST. PATIENT-LVL III: CPT | Mod: PBBFAC,,, | Performed by: FAMILY MEDICINE

## 2025-06-09 NOTE — PROGRESS NOTES
Patient ID: Madi Renee is a 79 y.o. male.    Chief Complaint: Follow-up (6  month follow up )    History of Present Illness    CHIEF COMPLAINT:  Madi presents today for follow-up    GERD:  He recently underwent a GI evaluation for his throat. He takes omeprazole 40 mg daily which has been effective in managing symptoms with no regurgitation since starting treatment. His morning diet includes 40 ounces of coffee with approximately one-third being half and half creamer.    ALLERGIES:  He reports severe seasonal allergy symptoms with current exacerbation.    MEDICAL HISTORY:  He has a history of heart murmur and is followed by a cardiologist at [facility] with annual echocardiogram monitoring. He is also followed by pulmonologist for pulmonary nodules. He has a history of penile malignant neoplasm from approximately 20 years ago, treated with circumcision, with no current issues.    LABORATORY RESULTS:  Vitamin B12 level was 223 (reference range: 210-950).    SOCIAL HISTORY:  He is a former smoker with approximately 60-year smoking history who quit in [id number].    CURRENT MEDICATIONS:  He takes omeprazole 40 mg daily, albuterol as needed, eye drops, and Vitamin D.      ROS:  ROS as indicated in HPI.         Physical Exam  Constitutional:       Appearance: Normal appearance.   HENT:      Head: Normocephalic and atraumatic.      Nose: Nose normal.   Eyes:      Extraocular Movements: Extraocular movements intact.      Pupils: Pupils are equal, round, and reactive to light.   Cardiovascular:      Rate and Rhythm: Normal rate and regular rhythm.      Pulses: Normal pulses.   Pulmonary:      Effort: Pulmonary effort is normal. No respiratory distress.      Breath sounds: Normal breath sounds. No wheezing or rhonchi.   Musculoskeletal:         General: Normal range of motion.   Skin:     General: Skin is warm and dry.   Neurological:      General: No focal deficit present.      Mental Status: He is alert and oriented to  person, place, and time.   Psychiatric:         Mood and Affect: Mood normal.         Behavior: Behavior normal.         Thought Content: Thought content normal.          Assessment & Plan    C60.9 Malignant neoplasm of penis, unspecified  J44.9 Mild chronic obstructive pulmonary disease  K21.9 Gastro-esophageal reflux disease without esophagitis  J30.2 Other seasonal allergic rhinitis  R01.1 Cardiac murmur, unspecified  R91.1 Solitary pulmonary nodule  Z87.891 Personal history of nicotine dependence    MALIGNANT NEOPLASM OF PENIS, UNSPECIFIED:  - Will update the record to reflect history of malignant neoplasm of the penis (removed over 20 years ago) rather than current diagnosis.  - No ongoing dermatology follow-up is currently needed given the remote history.    MILD CHRONIC OBSTRUCTIVE PULMONARY DISEASE:  - Madi is a former smoker with approximately 60-year smoking history, currently followed by pulmonologist Dr. Herman.  - Scheduled CT to evaluate pulmonary nodules.    GASTRO-ESOPHAGEAL REFLUX DISEASE:  - Reviewed history of reflux and recent GI evaluation.  - Madi is taking Omeprazole 40 mg daily with reported improvement in symptoms and no episodes of emesis.  - Discussed need for lifelong Omeprazole treatment and processed refill as requested.    SEASONAL ALLERGIC RHINITIS:  - Madi reports allergies are currently severe, affecting daily activities.  - Noted exacerbation during summer due to humidity.    CARDIAC MURMUR:  - Assessed cardiac status, noting known heart murmur with suspected mitral valve involvement.  - Madi is followed by a cardiologist at [facility] with annual echocardiograms.  - Will review recent echocardiogram results.    PULMONARY NODULES:  - Madi has history of pulmonary nodules being monitored by pulmonologist.  - Will check CT records for monitoring status.    HISTORY OF NICOTINE DEPENDENCE:  - Madi quit smoking in 2004 or 2005.    VITAMIN SUPPLEMENTATION:  - Explained  "difference between fat-soluble vitamins (potential for toxicity, especially vitamin D) and water-soluble vitamins.  - Discussed importance of appropriate vitamin D levels for bone health and osteoporosis prevention.  - Continuing vitamin D supplementation pending lab results.  - Provided information on B12 supplementation benefits, recommending OTC sublingual form.    FOLLOW-UP:  - Ordered comprehensive lab work including vitamin D level and long-term glucose test.  - Follow up to assess vitamin D levels to determine future supplementation needs.         Review of patient's allergies indicates:   Allergen Reactions    Lisinopril Anaphylaxis     Other reaction(s): Cough  PT REQUEST REMOVAL OF THIS MEDICATION    Grass pollen-june grass standard Other (See Comments)      Vitals:    06/09/25 0808   BP: 122/72   BP Location: Left arm   Patient Position: Sitting   Pulse: 63   SpO2: 98%   Weight: 73.2 kg (161 lb 6.4 oz)   Height: 5' 7" (1.702 m)           Results for orders placed or performed in visit on 12/18/24   POCT Bladder Scan    Collection Time: 12/18/24 11:05 AM   Result Value Ref Range    POC Residual Urine Volume 8 0 - 100 mL      Plan:          Hyperlipidemia, unspecified hyperlipidemia type  -     Lipid Panel; Future; Expected date: 06/09/2025    Essential hypertension  -     Microalbumin/Creatinine Ratio, Urine; Future; Expected date: 06/09/2025  -     CBC Auto Differential; Future; Expected date: 06/09/2025  -     Comprehensive Metabolic Panel; Future; Expected date: 06/09/2025  -     TSH; Future; Expected date: 06/09/2025  -     PSA, Screening; Future; Expected date: 06/09/2025    Solitary pulmonary nodule  Comments:  followed by Dr Hardin, pulmonology    Vitamin D deficiency  -     Vitamin D; Future; Expected date: 06/09/2025    B12 deficiency  -     Vitamin B12; Future; Expected date: 06/09/2025    Annual physical exam  -     Microalbumin/Creatinine Ratio, Urine; Future; Expected date: 06/09/2025  -     " Vitamin D; Future; Expected date: 06/09/2025  -     Vitamin B12; Future; Expected date: 06/09/2025  -     Lipid Panel; Future; Expected date: 06/09/2025  -     CBC Auto Differential; Future; Expected date: 06/09/2025  -     Comprehensive Metabolic Panel; Future; Expected date: 06/09/2025  -     Hemoglobin A1C; Future; Expected date: 06/09/2025  -     TSH; Future; Expected date: 06/09/2025  -     PSA, Screening; Future; Expected date: 06/09/2025    Encounter for screening for malignant neoplasm of prostate  -     PSA, Screening; Future; Expected date: 06/09/2025    Diabetes mellitus screening  -     Hemoglobin A1C; Future; Expected date: 06/09/2025    Mild chronic obstructive pulmonary disease  Comments:  followed by Dr Hardin, pulmonology    H/O malignant neoplasm  Comments:  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    This note was generated with the assistance of ambient listening technology. Verbal consent was obtained by the patient and accompanying visitor(s) for the recording of patient appointment to facilitate this note. I attest to having reviewed and edited the generated note for accuracy, though some syntax or spelling errors may persist. Please contact the author of this note for any clarification.

## 2025-06-19 ENCOUNTER — OFFICE VISIT (OUTPATIENT)
Dept: UROLOGY | Facility: CLINIC | Age: 80
End: 2025-06-19
Payer: MEDICARE

## 2025-06-19 DIAGNOSIS — R97.20 ELEVATED PSA: ICD-10-CM

## 2025-06-19 DIAGNOSIS — N35.816 OTHER STRICTURE OF OVERLAPPING SITES OF URETHRA IN MALE: ICD-10-CM

## 2025-06-19 DIAGNOSIS — N40.1 BENIGN PROSTATIC HYPERPLASIA WITH WEAK URINARY STREAM: Primary | ICD-10-CM

## 2025-06-19 DIAGNOSIS — R39.12 BENIGN PROSTATIC HYPERPLASIA WITH WEAK URINARY STREAM: Primary | ICD-10-CM

## 2025-06-19 PROCEDURE — 1159F MED LIST DOCD IN RCRD: CPT | Mod: CPTII,S$GLB,, | Performed by: UROLOGY

## 2025-06-19 PROCEDURE — 1101F PT FALLS ASSESS-DOCD LE1/YR: CPT | Mod: CPTII,S$GLB,, | Performed by: UROLOGY

## 2025-06-19 PROCEDURE — 1126F AMNT PAIN NOTED NONE PRSNT: CPT | Mod: CPTII,S$GLB,, | Performed by: UROLOGY

## 2025-06-19 PROCEDURE — 99214 OFFICE O/P EST MOD 30 MIN: CPT | Mod: S$GLB,,, | Performed by: UROLOGY

## 2025-06-19 PROCEDURE — 1160F RVW MEDS BY RX/DR IN RCRD: CPT | Mod: CPTII,S$GLB,, | Performed by: UROLOGY

## 2025-06-19 PROCEDURE — G2211 COMPLEX E/M VISIT ADD ON: HCPCS | Mod: S$GLB,,, | Performed by: UROLOGY

## 2025-06-19 PROCEDURE — 3288F FALL RISK ASSESSMENT DOCD: CPT | Mod: CPTII,S$GLB,, | Performed by: UROLOGY

## 2025-06-19 PROCEDURE — 99999 PR PBB SHADOW E&M-EST. PATIENT-LVL III: CPT | Mod: PBBFAC,,, | Performed by: UROLOGY

## 2025-06-19 NOTE — PROGRESS NOTES
[facility] Established Patient/H&P:    Madi Renee is a 79 y.o. male who presents for follow up for lower urinary tract symptoms.     Patient with a history of HTN and LUTS who presents to establish care.      He reports a several year history of lower urinary tract symptoms - incomplete emptying, intermittency, weak stream, urgency and straining previously managed by Dr. Charles Vargas.      He is on Flomax 0.4 mg and Finasteride 5 mg. Only on Finasteride 3 times weekly. 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 Not significantly bothered by his symptoms.      Per patient, he had balanitis in the past that was refractory to topical therapies. He underwent circumcision with Dr. Vargas. Patient states that he believes the pathology resulted skin cancer, but no record available. He has not had any therapies thereafter.      Urine dipstick with trace leukocytes today.      Retired . [employer] . Smoked 1 ppd for 50 years - quit in approximately 2004.        Interval History     8/30/24: Offered cystoscopy and TRUS for his LUTS at initial visit, but declined. Also increased Flomax to 0.8 and instructed to continue Finasteride. Here for follow up. He presented to the ED on 8/16/24 with gross hematuria after a COVID diagnosis with associated dysuria. UA micro with >100 RBC and 5 WBC. No urine culture performed. Discharged home with Keflex, Norco and Zofran. Hematuria resolved after Abx. Urine dipstick negative today. Remains on Flomax 0.8 mg and Finasteride 5 mg PO daily with stable lower urinary tract symptoms.      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 Three small right intrarenal stones measuring 2 mm each without hydronephrosis. Cyst of the left kidney measures 3.5 cm. Diverticulosis coli without CT evidence of diverticulitis.  Prostate hypertrophy. He underwent cystoscopy and TRUS on 9/25/24 with a 71 cc prostate with trilobar hypertrophy. 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 Bladder with mild trabeculations. No diverticula. Here today for follow up. Symptoms have improved and IPSS is down to 11. PVR low at 8 mL.     6/19/25: Here today for follow up. Remains on Flomax 0.8 mg and Finasteride 5 mg PO daily. Only with mild frequency, urgency and nocturia x 1.  Not bothered by his symptoms. PVR low.      Denies any fever, chills, flank pain, bone pain, unintentional weight loss,  trauma or history of  malignancy.         PSA*  1.7  6/9/25  2.0                   4/16/24  2.2                   2/22/23  2.6                   8/20/21  1.7                   11/12/18     A1C  5.4                   5/19/22     IPSS QoL  8 2 6/19/25  11 2 12/18/24  22        3          11/27/23     PVR  9 mL  6/19/25  8 mL                12/18/24  14 mL              11/27/23     *Finasteride    Past Medical History:   Diagnosis Date    High cholesterol     Hypertension     Vertigo        Past Surgical History:   Procedure Laterality Date    COLON SURGERY      COLONOSCOPY  04/23/2024    done at [facility]    CYSTOSCOPY N/A 9/25/2024    Procedure: CYSTOSCOPY;  Surgeon: Pam Ortiz Jr., MD;  Location: [facility];  Service: Urology;  Laterality: N/A;    pallup removal      SHOULDER SURGERY Right     SPINAL FUSION      TRANSRECTAL ULTRASOUND EXAMINATION N/A 9/25/2024    Procedure: ULTRASOUND, RECTAL APPROACH;  Surgeon: Pam Ortiz Jr., MD;  Location: [facility];  Service: Urology;  Laterality: N/A;       Review of patient's allergies indicates:   Allergen Reactions    Lisinopril Anaphylaxis     Other reaction(s): Cough  PT REQUEST REMOVAL OF THIS MEDICATION    Grass pollen-june grass standard Other (See Comments)       Medications Reviewed: see MAR    FOCUSED PHYSICAL  EXAM:    There were no vitals filed for this visit.  There is no height or weight on file to calculate BMI.           General: Alert, cooperative, no distress, appears stated age  Abdomen: Soft, non-tender, no CVA tenderness, non-distended        LABS:    Recent Results (from the past 2 weeks)   Vitamin D    Collection Time: 06/09/25  9:12 AM   Result Value Ref Range    Vitamin D 44 30 - 96 ng/mL   Vitamin B12    Collection Time: 06/09/25  9:12 AM   Result Value Ref Range    Vitamin B12 251 210 - 950 pg/mL   Lipid Panel    Collection Time: 06/09/25  9:12 AM   Result Value Ref Range    Cholesterol Total 155 120 - 199 mg/dL    Triglyceride 78 30 - 150 mg/dL    HDL Cholesterol 40 40 - 75 mg/dL    LDL Cholesterol 99.4 63.0 - 159.0 mg/dL    HDL/Cholesterol Ratio 25.8 20.0 - 50.0 %    Cholesterol/HDL Ratio 3.9 2.0 - 5.0    Non HDL Cholesterol 115 mg/dL   Comprehensive Metabolic Panel    Collection Time: 06/09/25  9:12 AM   Result Value Ref Range    Sodium 139 136 - 145 mmol/L    Potassium 4.2 3.5 - 5.1 mmol/L    Chloride 106 95 - 110 mmol/L    CO2 24 23 - 29 mmol/L    Glucose 96 70 - 110 mg/dL    BUN 14 8 - 23 mg/dL    Creatinine 1.0 0.5 - 1.4 mg/dL    Calcium 8.7 8.7 - 10.5 mg/dL    Protein Total 7.4 6.0 - 8.4 gm/dL    Albumin 3.8 3.5 - 5.2 g/dL    Bilirubin Total 1.2 (H) 0.1 - 1.0 mg/dL    ALP 77 40 - 150 unit/L    AST 23 11 - 45 unit/L    ALT 18 10 - 44 unit/L    Anion Gap 9 8 - 16 mmol/L    eGFR >60 >60 mL/min/1.73/m2   Hemoglobin A1C    Collection Time: 06/09/25  9:12 AM   Result Value Ref Range    Hemoglobin A1c 5.3 4.0 - 5.6 %    Estimated Average Glucose 105 68 - 131 mg/dL   TSH    Collection Time: 06/09/25  9:12 AM   Result Value Ref Range    TSH 1.623 0.400 - 4.000 uIU/mL   PSA, Screening    Collection Time: 06/09/25  9:12 AM   Result Value Ref Range    Prostate Specific Antigen 1.70 <=4.00 ng/mL   CBC with Differential    Collection Time: 06/09/25  9:12 AM   Result Value Ref Range    WBC 5.38 3.90 - 12.70 K/uL     RBC 4.92 4.60 - 6.20 M/uL    HGB 15.4 14.0 - 18.0 gm/dL    HCT 45.7 40.0 - 54.0 %    MCV 93 82 - 98 fL    MCH 31.3 (H) 27.0 - 31.0 pg    MCHC 33.7 32.0 - 36.0 g/dL    RDW 12.2 11.5 - 14.5 %    Platelet Count 154 150 - 450 K/uL    MPV 9.3 9.2 - 12.9 fL    Nucleated RBC 0 <=0 /100 WBC    Neut % 67.7 38 - 73 %    Lymph % 18.0 18 - 48 %    Mono % 7.1 4 - 15 %    Eos % 6.3 <=8 %    Basophil % 0.7 <=1.9 %    Imm Grans % 0.2 0.0 - 0.5 %    Neut # 3.64 1.8 - 7.7 K/uL    Lymph # 0.97 (L) 1 - 4.8 K/uL    Mono # 0.38 0.3 - 1 K/uL    Eos # 0.34 <=0.5 K/uL    Baso # 0.04 <=0.2 K/uL    Imm Grans # 0.01 0.00 - 0.04 K/uL   Microalbumin/Creatinine Ratio, Urine    Collection Time: 06/09/25  9:15 AM   Result Value Ref Range    Urine Microalbumin <5.0   ug/mL    Urine Creatinine 47.0 23.0 - 375.0 mg/dL    Microalbumin/Creatinine Ratio Urine           Assessment/Diagnosis:    1. Benign prostatic hyperplasia with weak urinary stream  POCT Bladder Scan      2. 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 Elevated PSA            Plans:    - I spent 30 minutes of the day of this encounter preparing for, treating and managing this patient. Visit today included increased complexity associated with the care of the episodic problem addressed and managing the longitudinal care of the patient due to the serious and/or complex managed problem(s) weak urinary stream. Extensive discussion with patient regarding the etiology and management of his lower urinary tract symptoms. Explained that LUTS are multifactorial and can be secondary to an enlarged prostate, PO intake of bladder irritants, overactive bladder, constipation, malignancy, trauma, infection, stones or medications. - Doing well with mild lower urinary tract symptoms.   - Continue Flomax 0.8 mg PO daily.   - Continue  Finasteride 5 mg PO daily.   - RTC in 1 year with symptom score.

## 2025-06-20 RX ORDER — FEXOFENADINE HCL 180 MG/1
180 TABLET ORAL DAILY
Qty: 90 TABLET | Refills: 3 | Status: SHIPPED | OUTPATIENT
Start: 2025-06-20

## 2025-06-23 NOTE — PROGRESS NOTES
Subjective:     Patient ID: Madi Renee is a 79 y.o. male    Chief Complaint: Follow-up       Madi is a 79 y.o. male who presents to the clinic complaining of painful ingrown toenail on the left foot.  Patient reports continued pain from left 1st digit medial nail border when ambulating in closed toe shoe gear.  Patient states the pain is not very severe but is uncomfortable in several of his shoes.  Patient currently rates pain 0/10.    Review of Systems   Constitutional: Negative.  Negative for chills and fever.   Respiratory:  Negative for cough and shortness of breath.    Cardiovascular:  Negative for chest pain and leg swelling.   Gastrointestinal:  Negative for diarrhea, nausea and vomiting.   Neurological:  Negative for tingling.        Objective:   Physical Exam  Vitals reviewed.   Constitutional:       General: He is not in acute distress.     Appearance: Normal appearance. He is not ill-appearing.   HENT:      Head: Normocephalic.      Nose: Nose normal.   Cardiovascular:      Pulses:           Dorsalis pedis pulses are 2+ on the right side and 2+ on the left side.        Posterior tibial pulses are 2+ on the right side and 2+ on the left side.   Pulmonary:      Effort: Pulmonary effort is normal. No respiratory distress.   Skin:     Capillary Refill: Capillary refill takes 2 to 3 seconds.   Neurological:      Mental Status: He is alert and oriented to person, place, and time.   Psychiatric:         Mood and Affect: Mood normal.         Behavior: Behavior normal.         Thought Content: Thought content normal.        Foot Exam    General  General Appearance: appears stated age and healthy   Orientation: alert and oriented to person, place, and time   Affect: appropriate       Right Foot/Ankle     Neurovascular  Dorsalis pedis: 2+  Posterior tibial: 2+    Muscle Strength  Ankle dorsiflexion: 5  Ankle plantar flexion: 5  Ankle inversion: 5  Ankle eversion: 5  Great toe extension: 5  Great toe flexion:  5      Left Foot/Ankle      Inspection and Palpation  Tenderness: (Left 1st digit distal medial nail border)    Neurovascular  Dorsalis pedis: 2+  Posterior tibial: 2+    Muscle Strength  Ankle dorsiflexion: 5  Ankle plantar flexion: 5  Ankle inversion: 5  Ankle eversion: 5  Great toe extension: 5  Great toe flexion: 5      Dermatologic:  Pincer nail noted to left 1st digit with incurvated distal left 1st medial nail border with hyperkeratotic skin within the medial nail fold, no acute ingrown toenail, no drainage      Assessment:         1. Pincer nail deformity    2. Pain of toe of left foot    3. Ingrown nail    4. Difficulty walking       Plan:     Madi Renee was seen today for   Chief Complaint   Patient presents with    Follow-up       Assessment & Plan           Routine Foot Care    Date/Time: 6/2/2025 9:15 AM    Performed by: Kurt Harrison DPM  Authorized by: Kurt Harrison DPM    Consent Done?:  Yes (Verbal)  Hyperkeratotic Skin Lesions?: No      Nail Care Type:  Trim(Left 1st Toe)  Patient tolerance:  Patient tolerated the procedure well with no immediate complications       1. Patient was examined and evaluated.    2. Discussed with patient mild ingrown nation did the left 1st digit medial nail border distally.  Patient made aware that secondary to a pincer nail deformity area will cause pain if it is elongated.  Patient was made aware that the area does not require a partial nail avulsion but 1 should be considered if symptoms continue to return over time.  Patient had triple antibiotic ointment and a dry sterile bandage applied absent performing slant back and debridement of the nail.  3. Patient will follow-up in 4 months or complaints      Kurt Harrison DPM  [id number] [address] [address]  [phone]      This note was created using [employer] direct voice recognition software. Note may have occasional typographical errors that may not have been identified and  edited despite initial review prior to signing.

## 2025-07-14 DIAGNOSIS — I10 ESSENTIAL HYPERTENSION: ICD-10-CM

## 2025-07-14 RX ORDER — VALSARTAN 80 MG/1
80 TABLET ORAL
Qty: 90 TABLET | Refills: 3 | Status: SHIPPED | OUTPATIENT
Start: 2025-07-14

## 2025-07-14 NOTE — TELEPHONE ENCOUNTER
Refill Decision Note   Madi Renee  is requesting a refill authorization.  Brief Assessment and Rationale for Refill:  Approve     Medication Therapy Plan:        Comments:     Note composed:12:00 PM 07/14/2025

## 2025-07-14 NOTE — TELEPHONE ENCOUNTER
No care due was identified.  [facility] Embedded Care Due Messages. Reference number: [id number].   7/14/2025 2:00:49 AM CDT

## 2025-07-25 ENCOUNTER — PATIENT MESSAGE (OUTPATIENT)
Dept: FAMILY MEDICINE | Facility: CLINIC | Age: 80
End: 2025-07-25
Payer: MEDICARE

## (undated) DEVICE — SET CYSTO IRR DRP CHMBR 84IN

## (undated) DEVICE — UNDERPAD ULTRASORB 300LB 30X36

## (undated) DEVICE — COVER PROBE 3D/4D

## (undated) DEVICE — LUBRICANT SURGILUBE 2 OZ

## (undated) DEVICE — KIT ENDOKIT EGD/COLON/ERCP

## (undated) DEVICE — COVER TRANSDUCER LATEX N/STERI

## (undated) DEVICE — SPONGE COTTON TRAY 4X4IN

## (undated) DEVICE — GLOVE SENSICARE PI ALOE 7.5